# Patient Record
Sex: FEMALE | Race: OTHER | NOT HISPANIC OR LATINO | URBAN - METROPOLITAN AREA
[De-identification: names, ages, dates, MRNs, and addresses within clinical notes are randomized per-mention and may not be internally consistent; named-entity substitution may affect disease eponyms.]

---

## 2018-08-06 ENCOUNTER — OUTPATIENT (OUTPATIENT)
Dept: OUTPATIENT SERVICES | Facility: HOSPITAL | Age: 83
LOS: 1 days | Discharge: HOME | End: 2018-08-06

## 2018-08-06 ENCOUNTER — APPOINTMENT (OUTPATIENT)
Dept: HEMATOLOGY ONCOLOGY | Facility: CLINIC | Age: 83
End: 2018-08-06

## 2018-08-06 ENCOUNTER — LABORATORY RESULT (OUTPATIENT)
Age: 83
End: 2018-08-06

## 2018-08-06 VITALS
RESPIRATION RATE: 12 BRPM | SYSTOLIC BLOOD PRESSURE: 171 MMHG | HEIGHT: 65 IN | BODY MASS INDEX: 22.66 KG/M2 | DIASTOLIC BLOOD PRESSURE: 80 MMHG | TEMPERATURE: 97.9 F | HEART RATE: 115 BPM | WEIGHT: 136 LBS

## 2018-08-06 DIAGNOSIS — K30 FUNCTIONAL DYSPEPSIA: ICD-10-CM

## 2018-08-06 DIAGNOSIS — Z63.4 DISAPPEARANCE AND DEATH OF FAMILY MEMBER: ICD-10-CM

## 2018-08-06 DIAGNOSIS — Z86.39 PERSONAL HISTORY OF OTHER ENDOCRINE, NUTRITIONAL AND METABOLIC DISEASE: ICD-10-CM

## 2018-08-06 LAB
HCT VFR BLD CALC: 38.9 %
HGB BLD-MCNC: 13.2 G/DL
MCHC RBC-ENTMCNC: 29 PG
MCHC RBC-ENTMCNC: 33.9 G/DL
MCV RBC AUTO: 85.5 FL
PLATELET # BLD AUTO: 233 K/UL
PMV BLD: 9.1 FL
RBC # BLD: 4.55 M/UL
RBC # FLD: 13.2 %
WBC # FLD AUTO: 11.7 K/UL

## 2018-08-06 SDOH — SOCIAL STABILITY - SOCIAL INSECURITY: DISSAPEARANCE AND DEATH OF FAMILY MEMBER: Z63.4

## 2018-08-07 LAB
ALBUMIN SERPL ELPH-MCNC: 4.6 G/DL
ALP BLD-CCNC: 107 U/L
ALT SERPL-CCNC: 13 U/L
ANION GAP SERPL CALC-SCNC: 13 MMOL/L
AST SERPL-CCNC: 16 U/L
BILIRUB SERPL-MCNC: 0.3 MG/DL
BUN SERPL-MCNC: 16 MG/DL
CALCIUM SERPL-MCNC: 9.2 MG/DL
CHLORIDE SERPL-SCNC: 101 MMOL/L
CO2 SERPL-SCNC: 26 MMOL/L
CREAT SERPL-MCNC: 1 MG/DL
GLUCOSE SERPL-MCNC: 87 MG/DL
LDH SERPL-CCNC: 180
POTASSIUM SERPL-SCNC: 4.6 MMOL/L
PROT SERPL-MCNC: 8 G/DL
SODIUM SERPL-SCNC: 140 MMOL/L

## 2018-08-08 LAB
ALBUMIN MFR SERPL ELPH: 53 %
ALBUMIN SERPL-MCNC: 4.2 G/DL
ALBUMIN/GLOB SERPL: 1.1 RATIO
ALPHA1 GLOB MFR SERPL ELPH: 3.5 %
ALPHA1 GLOB SERPL ELPH-MCNC: 0.3 G/DL
ALPHA2 GLOB MFR SERPL ELPH: 7.4 %
ALPHA2 GLOB SERPL ELPH-MCNC: 0.6 G/DL
B-GLOBULIN MFR SERPL ELPH: 8.7 %
B-GLOBULIN SERPL ELPH-MCNC: 0.7 G/DL
GAMMA GLOB FLD ELPH-MCNC: 2.2 G/DL
GAMMA GLOB MFR SERPL ELPH: 27.4 %
INTERPRETATION SERPL IEP-IMP: NORMAL
M PROTEIN MFR SERPL ELPH: NORMAL %
M PROTEIN SPEC IFE-MCNC: NORMAL
MONOCLON BAND OBS SERPL: NORMAL G/DL
PROT SERPL-MCNC: 8 G/DL

## 2018-08-09 ENCOUNTER — LABORATORY RESULT (OUTPATIENT)
Age: 83
End: 2018-08-09

## 2018-08-09 LAB
DEPRECATED KAPPA LC FREE/LAMBDA SER: 1.85 RATIO
IGA SER QL IEP: 127 MG/DL
IGG SER QL IEP: 2364 MG/DL
IGM SER QL IEP: 178 MG/DL
KAPPA LC CSF-MCNC: 2.39 MG/DL
KAPPA LC SERPL-MCNC: 4.41 MG/DL

## 2018-08-10 LAB
CREAT 24H UR-MCNC: 0.6 G/24 HR
CREAT ?TM UR-MCNC: 26 MG/DL
PROT 24H UR-MRATE: 11 MG/DL
PROT ?TM UR-MCNC: 24 HR
PROT UR-MCNC: 270 MG/24 H
SPECIMEN VOL 24H UR: 2450 ML

## 2018-08-13 DIAGNOSIS — D47.2 MONOCLONAL GAMMOPATHY: ICD-10-CM

## 2018-08-13 LAB — IGA 24H UR QL IFE: NORMAL

## 2018-11-26 ENCOUNTER — APPOINTMENT (OUTPATIENT)
Dept: HEMATOLOGY ONCOLOGY | Facility: CLINIC | Age: 83
End: 2018-11-26

## 2018-11-26 ENCOUNTER — APPOINTMENT (OUTPATIENT)
Dept: SURGERY | Facility: CLINIC | Age: 83
End: 2018-11-26

## 2019-09-09 ENCOUNTER — APPOINTMENT (OUTPATIENT)
Dept: HEMATOLOGY ONCOLOGY | Facility: CLINIC | Age: 84
End: 2019-09-09
Payer: MEDICARE

## 2019-09-09 ENCOUNTER — LABORATORY RESULT (OUTPATIENT)
Age: 84
End: 2019-09-09

## 2019-09-09 ENCOUNTER — OUTPATIENT (OUTPATIENT)
Dept: OUTPATIENT SERVICES | Facility: HOSPITAL | Age: 84
LOS: 1 days | Discharge: HOME | End: 2019-09-09

## 2019-09-09 VITALS
BODY MASS INDEX: 21.33 KG/M2 | TEMPERATURE: 96.6 F | WEIGHT: 128 LBS | HEIGHT: 65 IN | SYSTOLIC BLOOD PRESSURE: 174 MMHG | HEART RATE: 87 BPM | DIASTOLIC BLOOD PRESSURE: 74 MMHG | RESPIRATION RATE: 14 BRPM

## 2019-09-09 PROCEDURE — 99213 OFFICE O/P EST LOW 20 MIN: CPT

## 2019-09-10 LAB
ALBUMIN SERPL ELPH-MCNC: 4.6 G/DL
ALP BLD-CCNC: 114 U/L
ALT SERPL-CCNC: 14 U/L
ANION GAP SERPL CALC-SCNC: 16 MMOL/L
AST SERPL-CCNC: 19 U/L
BILIRUB SERPL-MCNC: 0.3 MG/DL
BUN SERPL-MCNC: 17 MG/DL
CALCIUM SERPL-MCNC: 9.5 MG/DL
CHLORIDE SERPL-SCNC: 98 MMOL/L
CO2 SERPL-SCNC: 25 MMOL/L
CREAT SERPL-MCNC: 1 MG/DL
DEPRECATED KAPPA LC FREE/LAMBDA SER: 2.46 RATIO
GLUCOSE SERPL-MCNC: 109 MG/DL
HCT VFR BLD CALC: 40.4 %
HGB BLD-MCNC: 13.6 G/DL
IGG SER QL IEP: 2253 MG/DL
KAPPA LC CSF-MCNC: 1.64 MG/DL
KAPPA LC SERPL-MCNC: 4.04 MG/DL
MCHC RBC-ENTMCNC: 28.8 PG
MCHC RBC-ENTMCNC: 33.7 G/DL
MCV RBC AUTO: 85.6 FL
PLATELET # BLD AUTO: 230 K/UL
PMV BLD: 9.4 FL
POTASSIUM SERPL-SCNC: 4 MMOL/L
PROT SERPL-MCNC: 8.4 G/DL
RBC # BLD: 4.72 M/UL
RBC # FLD: 13.5 %
SODIUM SERPL-SCNC: 139 MMOL/L
T4 SERPL-MCNC: 5.5 UG/DL
TSH SERPL-ACNC: 1.36 UIU/ML
WBC # FLD AUTO: 10.95 K/UL

## 2019-09-10 NOTE — CONSULT LETTER
[Dear  ___] : Dear ~TONO, [Courtesy Letter:] : I had the pleasure of seeing your patient, [unfilled], in my office today. [Please see my note below.] : Please see my note below. [Consult Closing:] : Thank you very much for allowing me to participate in the care of this patient.  If you have any questions, please do not hesitate to contact me. [Sincerely,] : Sincerely, [FreeTextEntry2] : Dr. Caesar Fournier [FreeTextEntry3] : Dr. CHRISTY Lao

## 2019-09-10 NOTE — REVIEW OF SYSTEMS
[Fatigue] : fatigue [Recent Change In Weight] : ~T recent weight change [SOB on Exertion] : shortness of breath during exertion [Anxiety] : anxiety [Negative] : Heme/Lymph

## 2019-09-10 NOTE — ASSESSMENT
[FreeTextEntry1] : 1. IgG small monoclonal spike. Clinically stable.\par 2. Base of tongue squamous cell carcinoma, S/P surgery including bilateral neck dissection, 1 LN +.\par 3. Fatigue, which started after surgery.\par \par The situation was discussed at length with the patient and her daughter.\par She was encouraged to remain physically active and do simple physical activities such as walking.\par \par We will draw a CBC, CMP, LDH, SPEP with IFES, IgG, free light chains. \par Further recommendations after the above test results are available. If no significant change, the patient will be seen again in 6 months for follow up. Otherwise, she will keep all her appointments with all her other physicians and also proceed with the schedule of her screenings.\par \par All questions answered.

## 2019-09-10 NOTE — PHYSICAL EXAM
[Restricted in physically strenuous activity but ambulatory and able to carry out work of a light or sedentary nature] : Status 1- Restricted in physically strenuous activity but ambulatory and able to carry out work of a light or sedentary nature, e.g., light house work, office work [de-identified] : Arthritic changes present [Normal] : grossly intact [de-identified] : Bilateral scars noted on the neck corresponding to the lymph node dissection.

## 2019-09-10 NOTE — REASON FOR VISIT
[Follow-Up Visit] : a follow-up visit for [Family Member] : family member [FreeTextEntry2] : IgG monoclonal gammopathy

## 2019-09-10 NOTE — HISTORY OF PRESENT ILLNESS
[Disease:__________________________] : Disease: [unfilled] [de-identified] : The patient is coming for a follow up after a hiatus of over a year.\par Since her last visit with, the patient had developed mild difficulty swallowing, around November of last year, for which she consulted her physicians, including an ENT specialist who diagnosed a tumor at the base of the tongue (at least that's my impression form her history). Apparently it was p16 positive. The patient then underwent resection of the tumor and bilateral cervical lymp node dissection. She was found to have 1 positive lymph node out of many (?40?). Apparently her case was discussed in multidisciplinary meetings and it was decided not to give any further treatment such as radiation.\par The patient has recovered from surgery although during the tyra-surgical time she lost significant amount of weight but has been recovering. \par Her major problem is significant fatigue which is affecting also her mood as well as the appetite.\par Although she is able to move around, however she gets tired very fast and most of the time all she wants to do is rest.\par She is denying any cough or shortness of breath. No problems with urine or bowel movements. No new pains or aches even at the surgical sites. [de-identified] : Squamous cell

## 2019-09-11 LAB
ALBUMIN MFR SERPL ELPH: 55.8 %
ALBUMIN SERPL-MCNC: 4.7 G/DL
ALBUMIN/GLOB SERPL: 1.3 RATIO
ALPHA1 GLOB MFR SERPL ELPH: 3.4 %
ALPHA1 GLOB SERPL ELPH-MCNC: 0.3 G/DL
ALPHA2 GLOB MFR SERPL ELPH: 7 %
ALPHA2 GLOB SERPL ELPH-MCNC: 0.6 G/DL
B-GLOBULIN MFR SERPL ELPH: 8.6 %
B-GLOBULIN SERPL ELPH-MCNC: 0.7 G/DL
GAMMA GLOB FLD ELPH-MCNC: 2.1 G/DL
GAMMA GLOB MFR SERPL ELPH: 25.2 %
INTERPRETATION SERPL IEP-IMP: NORMAL
M PROTEIN MFR SERPL ELPH: 6.5 %
M PROTEIN SPEC IFE-MCNC: NORMAL
MONOCLON BAND OBS SERPL: 0.5 G/DL
PROT SERPL-MCNC: 8.4 G/DL

## 2019-09-12 DIAGNOSIS — R53.83 OTHER FATIGUE: ICD-10-CM

## 2019-09-12 DIAGNOSIS — D47.2 MONOCLONAL GAMMOPATHY: ICD-10-CM

## 2019-09-12 DIAGNOSIS — C76.0 MALIGNANT NEOPLASM OF HEAD, FACE AND NECK: ICD-10-CM

## 2019-10-20 ENCOUNTER — INPATIENT (INPATIENT)
Facility: HOSPITAL | Age: 84
LOS: 4 days | Discharge: NURSING, REHAB OR LONG-TERM CARE | End: 2019-10-25
Attending: HOSPITALIST | Admitting: HOSPITALIST
Payer: MEDICARE

## 2019-10-20 VITALS
RESPIRATION RATE: 18 BRPM | SYSTOLIC BLOOD PRESSURE: 180 MMHG | DIASTOLIC BLOOD PRESSURE: 73 MMHG | TEMPERATURE: 98 F | HEART RATE: 99 BPM | OXYGEN SATURATION: 99 %

## 2019-10-20 DIAGNOSIS — Z98.890 OTHER SPECIFIED POSTPROCEDURAL STATES: Chronic | ICD-10-CM

## 2019-10-20 DIAGNOSIS — Z90.49 ACQUIRED ABSENCE OF OTHER SPECIFIED PARTS OF DIGESTIVE TRACT: Chronic | ICD-10-CM

## 2019-10-20 DIAGNOSIS — H26.9 UNSPECIFIED CATARACT: Chronic | ICD-10-CM

## 2019-10-20 DIAGNOSIS — Z90.710 ACQUIRED ABSENCE OF BOTH CERVIX AND UTERUS: Chronic | ICD-10-CM

## 2019-10-20 LAB
ALBUMIN SERPL ELPH-MCNC: 4.7 G/DL — SIGNIFICANT CHANGE UP (ref 3.5–5.2)
ALP SERPL-CCNC: 115 U/L — SIGNIFICANT CHANGE UP (ref 30–115)
ALT FLD-CCNC: 18 U/L — SIGNIFICANT CHANGE UP (ref 0–41)
ANION GAP SERPL CALC-SCNC: 15 MMOL/L — HIGH (ref 7–14)
APTT BLD: 29.1 SEC — SIGNIFICANT CHANGE UP (ref 27–39.2)
AST SERPL-CCNC: 22 U/L — SIGNIFICANT CHANGE UP (ref 0–41)
BASOPHILS # BLD AUTO: 0.05 K/UL — SIGNIFICANT CHANGE UP (ref 0–0.2)
BASOPHILS NFR BLD AUTO: 0.2 % — SIGNIFICANT CHANGE UP (ref 0–1)
BILIRUB SERPL-MCNC: 0.4 MG/DL — SIGNIFICANT CHANGE UP (ref 0.2–1.2)
BLD GP AB SCN SERPL QL: SIGNIFICANT CHANGE UP
BUN SERPL-MCNC: 20 MG/DL — SIGNIFICANT CHANGE UP (ref 10–20)
CALCIUM SERPL-MCNC: 9.4 MG/DL — SIGNIFICANT CHANGE UP (ref 8.5–10.1)
CHLORIDE SERPL-SCNC: 97 MMOL/L — LOW (ref 98–110)
CO2 SERPL-SCNC: 24 MMOL/L — SIGNIFICANT CHANGE UP (ref 17–32)
CREAT SERPL-MCNC: 1 MG/DL — SIGNIFICANT CHANGE UP (ref 0.7–1.5)
EOSINOPHIL # BLD AUTO: 0.02 K/UL — SIGNIFICANT CHANGE UP (ref 0–0.7)
EOSINOPHIL NFR BLD AUTO: 0.1 % — SIGNIFICANT CHANGE UP (ref 0–8)
GLUCOSE SERPL-MCNC: 112 MG/DL — HIGH (ref 70–99)
HCT VFR BLD CALC: 40.4 % — SIGNIFICANT CHANGE UP (ref 37–47)
HGB BLD-MCNC: 13.5 G/DL — SIGNIFICANT CHANGE UP (ref 12–16)
IMM GRANULOCYTES NFR BLD AUTO: 1.1 % — HIGH (ref 0.1–0.3)
INR BLD: 1.09 RATIO — SIGNIFICANT CHANGE UP (ref 0.65–1.3)
LYMPHOCYTES # BLD AUTO: 0.95 K/UL — LOW (ref 1.2–3.4)
LYMPHOCYTES # BLD AUTO: 3.8 % — LOW (ref 20.5–51.1)
MCHC RBC-ENTMCNC: 28.4 PG — SIGNIFICANT CHANGE UP (ref 27–31)
MCHC RBC-ENTMCNC: 33.4 G/DL — SIGNIFICANT CHANGE UP (ref 32–37)
MCV RBC AUTO: 84.9 FL — SIGNIFICANT CHANGE UP (ref 81–99)
MONOCYTES # BLD AUTO: 1.22 K/UL — HIGH (ref 0.1–0.6)
MONOCYTES NFR BLD AUTO: 4.9 % — SIGNIFICANT CHANGE UP (ref 1.7–9.3)
NEUTROPHILS # BLD AUTO: 22.19 K/UL — HIGH (ref 1.4–6.5)
NEUTROPHILS NFR BLD AUTO: 89.9 % — HIGH (ref 42.2–75.2)
NRBC # BLD: 0 /100 WBCS — SIGNIFICANT CHANGE UP (ref 0–0)
PLATELET # BLD AUTO: 236 K/UL — SIGNIFICANT CHANGE UP (ref 130–400)
POTASSIUM SERPL-MCNC: 3.9 MMOL/L — SIGNIFICANT CHANGE UP (ref 3.5–5)
POTASSIUM SERPL-SCNC: 3.9 MMOL/L — SIGNIFICANT CHANGE UP (ref 3.5–5)
PROT SERPL-MCNC: 8.6 G/DL — HIGH (ref 6–8)
PROTHROM AB SERPL-ACNC: 12.5 SEC — SIGNIFICANT CHANGE UP (ref 9.95–12.87)
RBC # BLD: 4.76 M/UL — SIGNIFICANT CHANGE UP (ref 4.2–5.4)
RBC # FLD: 13.6 % — SIGNIFICANT CHANGE UP (ref 11.5–14.5)
SODIUM SERPL-SCNC: 136 MMOL/L — SIGNIFICANT CHANGE UP (ref 135–146)
WBC # BLD: 24.69 K/UL — HIGH (ref 4.8–10.8)
WBC # FLD AUTO: 24.69 K/UL — HIGH (ref 4.8–10.8)

## 2019-10-20 PROCEDURE — 71045 X-RAY EXAM CHEST 1 VIEW: CPT | Mod: 26

## 2019-10-20 PROCEDURE — 93010 ELECTROCARDIOGRAM REPORT: CPT

## 2019-10-20 PROCEDURE — 99285 EMERGENCY DEPT VISIT HI MDM: CPT | Mod: GC

## 2019-10-20 PROCEDURE — 72192 CT PELVIS W/O DYE: CPT | Mod: 26

## 2019-10-20 PROCEDURE — 73502 X-RAY EXAM HIP UNI 2-3 VIEWS: CPT | Mod: 26,RT

## 2019-10-20 PROCEDURE — 73552 X-RAY EXAM OF FEMUR 2/>: CPT | Mod: 26,RT

## 2019-10-20 RX ORDER — PREGABALIN 225 MG/1
1 CAPSULE ORAL
Qty: 0 | Refills: 0 | DISCHARGE

## 2019-10-20 RX ORDER — OXYCODONE AND ACETAMINOPHEN 5; 325 MG/1; MG/1
1 TABLET ORAL EVERY 6 HOURS
Refills: 0 | Status: DISCONTINUED | OUTPATIENT
Start: 2019-10-20 | End: 2019-10-21

## 2019-10-20 RX ORDER — DOCUSATE SODIUM 100 MG
100 CAPSULE ORAL
Refills: 0 | Status: DISCONTINUED | OUTPATIENT
Start: 2019-10-20 | End: 2019-10-22

## 2019-10-20 RX ORDER — FERROUS SULFATE 325(65) MG
0 TABLET ORAL
Qty: 0 | Refills: 0 | DISCHARGE

## 2019-10-20 RX ORDER — CHLORHEXIDINE GLUCONATE 213 G/1000ML
1 SOLUTION TOPICAL
Refills: 0 | Status: DISCONTINUED | OUTPATIENT
Start: 2019-10-20 | End: 2019-10-22

## 2019-10-20 RX ORDER — ONDANSETRON 8 MG/1
4 TABLET, FILM COATED ORAL ONCE
Refills: 0 | Status: COMPLETED | OUTPATIENT
Start: 2019-10-20 | End: 2019-10-20

## 2019-10-20 RX ORDER — MORPHINE SULFATE 50 MG/1
4 CAPSULE, EXTENDED RELEASE ORAL ONCE
Refills: 0 | Status: DISCONTINUED | OUTPATIENT
Start: 2019-10-20 | End: 2019-10-20

## 2019-10-20 RX ORDER — LOSARTAN POTASSIUM 100 MG/1
100 TABLET, FILM COATED ORAL DAILY
Refills: 0 | Status: DISCONTINUED | OUTPATIENT
Start: 2019-10-20 | End: 2019-10-22

## 2019-10-20 RX ORDER — ACETAMINOPHEN 500 MG
650 TABLET ORAL EVERY 4 HOURS
Refills: 0 | Status: DISCONTINUED | OUTPATIENT
Start: 2019-10-20 | End: 2019-10-22

## 2019-10-20 RX ORDER — IRBESARTAN 75 MG/1
1 TABLET ORAL
Qty: 0 | Refills: 0 | DISCHARGE

## 2019-10-20 RX ORDER — FERROUS SULFATE 325(65) MG
325 TABLET ORAL DAILY
Refills: 0 | Status: DISCONTINUED | OUTPATIENT
Start: 2019-10-20 | End: 2019-10-22

## 2019-10-20 RX ORDER — LEVOTHYROXINE SODIUM 125 MCG
50 TABLET ORAL DAILY
Refills: 0 | Status: DISCONTINUED | OUTPATIENT
Start: 2019-10-20 | End: 2019-10-22

## 2019-10-20 RX ORDER — MORPHINE SULFATE 50 MG/1
2 CAPSULE, EXTENDED RELEASE ORAL EVERY 6 HOURS
Refills: 0 | Status: DISCONTINUED | OUTPATIENT
Start: 2019-10-20 | End: 2019-10-22

## 2019-10-20 RX ADMIN — MORPHINE SULFATE 2 MILLIGRAM(S): 50 CAPSULE, EXTENDED RELEASE ORAL at 17:13

## 2019-10-20 RX ADMIN — MORPHINE SULFATE 4 MILLIGRAM(S): 50 CAPSULE, EXTENDED RELEASE ORAL at 14:20

## 2019-10-20 RX ADMIN — Medication 100 MILLIGRAM(S): at 18:12

## 2019-10-20 RX ADMIN — Medication 325 MILLIGRAM(S): at 18:11

## 2019-10-20 RX ADMIN — ONDANSETRON 4 MILLIGRAM(S): 8 TABLET, FILM COATED ORAL at 14:20

## 2019-10-20 RX ADMIN — Medication 1 TABLET(S): at 18:12

## 2019-10-20 RX ADMIN — Medication 1 TABLET(S): at 18:11

## 2019-10-20 RX ADMIN — OXYCODONE AND ACETAMINOPHEN 1 TABLET(S): 5; 325 TABLET ORAL at 22:23

## 2019-10-20 RX ADMIN — MORPHINE SULFATE 2 MILLIGRAM(S): 50 CAPSULE, EXTENDED RELEASE ORAL at 17:28

## 2019-10-20 RX ADMIN — MORPHINE SULFATE 2 MILLIGRAM(S): 50 CAPSULE, EXTENDED RELEASE ORAL at 21:14

## 2019-10-20 NOTE — ED ADULT NURSE NOTE - ED STAT RN HANDOFF DETAILS
pt resting comfortably, denies any discomfort. no distress noted at this time. iv intact, safety maintained, VSS. awaiting OR

## 2019-10-20 NOTE — ED PROVIDER NOTE - NS ED ROS FT
General: No fever, chills, or weakness.  Eyes:  No visual changes, eye pain or discharge.  ENMT:  No hearing changes, pain, no sore throat or runny nose, no difficulty swallowing  Cardiac:  No chest pain, SOB or edema. No chest pain with exertion.  Respiratory:  No cough or respiratory distress. No hemoptysis. No history of asthma or RAD.  GI:  No nausea, vomiting, diarrhea or abdominal pain.  :  No dysuria, frequency or burning.  MS:  Right hip pain  Neuro:  No headache.  No LOC. No change in ambulation. No dizziness.  Skin:  No skin rash.   Endocrine: No history of thyroid disease or diabetes.

## 2019-10-20 NOTE — H&P ADULT - NSICDXPASTSURGICALHX_GEN_ALL_CORE_FT
PAST SURGICAL HISTORY:  Capsular cataract of both eyes     H/O abdominal hysterectomy     History of parathyroid surgery     Status post right breast lumpectomy PAST SURGICAL HISTORY:  Capsular cataract of both eyes     H/O abdominal hysterectomy     History of parathyroid surgery     History of partial colectomy     Status post right breast lumpectomy

## 2019-10-20 NOTE — H&P ADULT - NSHPSOCIALHISTORY_GEN_ALL_CORE
Lives with: (  ) alone  (  ) children   (  ) spouse   (  ) parents  (  ) other  Recent Travel: No recent travel    Substance Use (street drugs): ( x ) never used  (  ) other:  Tobacco Usage:  ( x  ) never smoked   (   ) former smoker   (   ) current smoker  (     ) pack year  Alcohol Usage: None

## 2019-10-20 NOTE — CONSULT NOTE ADULT - ATTENDING COMMENTS
agree with above  seen and examined  will require operative fixation pending optimization and or availability

## 2019-10-20 NOTE — ED PROVIDER NOTE - PROGRESS NOTE DETAILS
84F not on AC p/w fall from standing. Shortened, ER RLE. Likely fx. Morphine, zofran, imaging, labs, ekg. Reassess after XR Attending Note: I personally evaluated the patient. I reviewed the Resident’s note, and agree with the findings and plan except as documented in my note.   85 yo F PMH of thyroid CA, breast CA, osteoporosis, iron deficiency, and HTN presents s/p mechanical fall today. Unable to get up second to pain on R hip. No LOC, head injury, CP, abd pain or SOB. Exam: vss, nontoxic, well appearing, airway intact, GCS 15, PERRL, EOMI, MMM, no cervical-thoracic-lumbar spine tenderness, neck supple, CTAB, no crepitus over chest wall, RRR, equal radial pulses bilat, abd soft/nt/nd, no fnd. FROMx4, no ecchymosis, (+) R lower extremity rotated and shortened. Log roll with pain. Neurovascularly intact. Plan: Labs, imaging and reassess. AA: Kathleen from ortho called, at bedside. Will order CT non-con of hip, as requested by ortho Request by ortho to get a CT pelvis pre op.

## 2019-10-20 NOTE — ED PROVIDER NOTE - OBJECTIVE STATEMENT
84F h/o HTN, hypothyroidism p/w fall. Mechanical fall from standing, fell onto right hip while in kitchen. Unable to get up on her own. Admits to severe right hip pain, 10/10, non radiating. Unable to move leg. Denies head trauma, LOC, CP, SOB, abd pain, n/v/d, back pain. 84F h/o HTN, hypothyroidism, s/p tonsillectomy, s/p right breast lumpectomy, s/p JACKELYN p/w fall. Mechanical fall from standing, fell onto right hip while in kitchen. Unable to get up on her own. Admits to severe right hip pain, 10/10, non radiating. Unable to move leg. Denies head trauma, LOC, CP, SOB, abd pain, n/v/d, back pain.

## 2019-10-20 NOTE — ED ADULT NURSE NOTE - PSH
Capsular cataract of both eyes    H/O abdominal hysterectomy    History of parathyroid surgery    Status post right breast lumpectomy

## 2019-10-20 NOTE — H&P ADULT - ASSESSMENT
85 yo female with PMHx of HTN, hypothyroidism, breast CA s/p lumpectomy presents s/p mechanical fall with right hip pain and inability to bear weight, found to have acute right femoral neck transcervical fracture.    #Acute right femoral neck transcervical fracture s/p mechanical fall  -Xray showed Acute right femoral neck transcervical fracture in varus angulation with severe right hip degenerative change and joint space narrowing. Moderate left hip osteoarthritis present.  -F/u CT pelvis  -Ortho: surgical fixation of right hip fracture with hemiarthroplasty vs total hip arthroplasty- likely tomorrow in the OR.   -NPO after midnight. Pain control prn. Bedrest for now  -Medical optimization requested by ortho  -PT/Rehab post surgical intervention    #HTN-c/w losartan 100mg  (converted from irbesartan); DASH diet  #Hypothyroidism-c/w Synthroid  #Breast Ca s/p Lumpectomy- Stable    #DVT ppx: SCDs for now  #GI ppx: not indicated  #Diet: DASH; NPO after MN  #Activity: Bedrest for now  #CHG  #FULL code  #Dispo: acute 83 yo female with PMHx of HTN, hypothyroidism, breast CA s/p lumpectomy presents s/p mechanical fall with right hip pain and inability to bear weight, found to have acute right femoral neck transcervical fracture.    #Acute right femoral neck transcervical fracture s/p mechanical fall  -Xray showed Acute right femoral neck transcervical fracture in varus angulation with severe right hip degenerative change and joint space narrowing. Moderate left hip osteoarthritis present.  -F/u CT pelvis  -Ortho: surgical fixation of right hip fracture with hemiarthroplasty vs total hip arthroplasty- likely tomorrow in the OR.   -NPO after midnight. Pain control prn. Bedrest for now  -Medical optimization requested by ortho  -PT/Rehab post surgical intervention (please order after surgery)    #HTN-c/w losartan 100mg  (converted from irbesartan); DASH diet  #Hypothyroidism-c/w Synthroid  #Breast Ca s/p Lumpectomy- Stable    #DVT ppx: SCDs for now  #GI ppx: not indicated  #Diet: DASH; NPO after MN  #Activity: Bedrest for now  #CHG  #FULL code  #Dispo: acute 85 yo female with PMHx of HTN, hypothyroidism, tonsillar CA s/p tonsillectomy and base of tongue resection, breast CA s/p lumpectomy presents s/p mechanical fall with right hip pain and inability to bear weight, found to have acute right femoral neck transcervical fracture.    #Acute right femoral neck transcervical fracture s/p mechanical fall  -Xray showed Acute right femoral neck transcervical fracture in varus angulation with severe right hip degenerative change and joint space narrowing. Moderate left hip osteoarthritis present.  -F/u CT pelvis  -Ortho: surgical fixation of right hip fracture with hemiarthroplasty vs total hip arthroplasty- likely tomorrow in the OR.   -NPO after midnight. Pain control prn. Bedrest for now  -Medical optimization requested by ortho  -PT/Rehab post surgical intervention (please order after surgery)    #HTN-c/w losartan 100mg  (converted from irbesartan); DASH diet  #Hypothyroidism-c/w Synthroid  #Breast Ca s/p Lumpectomy- Stable    #DVT ppx: SCDs for now  #GI ppx: not indicated  #Diet: DASH; NPO after MN  #Activity: Bedrest for now  #CHG  #FULL code  #Dispo: acute

## 2019-10-20 NOTE — H&P ADULT - HISTORY OF PRESENT ILLNESS
85 yo female  PMHx: HTN, hypothyroidism, Tonsil cancer, Breast Ca s/p Rt Lumpectomy, JACKELYN  CC: Fall      ER course: Vitals were /73, HR 99, T 98F, SpO2 99% on RA. Pt 85 yo female  PMHx: HTN, hypothyroidism, Tonsil cancer, Breast Ca s/p Rt Lumpectomy, JACKELYN  CC: Right hip pain and inability to ambulate s/p mechanical fall  History dates back to this morning when pt was vacuuming, tripped over the cord  and fell onto her right side. Pt had immediate pain afterwards with inability to ambulate. Pt did not feel dizzy prior or post fall. No generalized shaking body movements. Pt denied LOC, headaches, blurry vision, chest pain, palpitations, SOB, Nausea/vomiting, fever, rash or abdominal pain. Pt denied numbness, tingling or weakness of b/l LE  Pt was on the ground for     min and did not hit her head.   ER course: Vitals were /73, HR 99, T 98F, SpO2 99% on RA. XRay hip showed right femur fracture with orthopedics to take patient for surgical intervention tomorrow possibly. 85 yo female  PMHx: HTN, hypothyroidism, Tonsil cancer, Breast Ca s/p Rt Lumpectomy, s/p JACKELYN, s/p parathyroid surgery  CC: Right hip pain and inability to ambulate s/p mechanical fall  History dates back to this morning when pt was vacuuming, tripped over the cord  and fell onto her right side. Pt had immediate pain afterwards with inability to ambulate. Pt did not feel dizzy prior or post fall. No generalized shaking body movements. Pt denied LOC, headaches, blurry vision, chest pain, palpitations, SOB, Nausea/vomiting, fever, rash or abdominal pain. Pt denied numbness, tingling or weakness of b/l LE  Pt was on the ground for     min and did not hit her head.   ER course: Vitals were /73, HR 99, T 98F, SpO2 99% on RA. XRay hip showed right femur fracture with orthopedics to take patient for surgical intervention tomorrow possibly. 85 yo female  PMHx: HTN, hypothyroidism, GERD, Tonsil cancer, Breast Ca s/p Rt Lumpectomy, s/p JACKELYN, s/p parathyroid surgery, laparoscopic partial colectomy  CC: Right hip pain and inability to ambulate s/p mechanical fall  History dates back to this morning when pt was vacuuming, tripped over the cord  and fell onto her right side. Pt had immediate pain afterwards with inability to ambulate. Pt was on the ground for about 30 min and slid herself to get to the phone. Pt did not hit her head. Pt did not feel dizzy prior or post fall. No generalized shaking body movements. Pt denied LOC, headaches, blurry vision, chest pain, palpitations, SOB, Nausea/vomiting, fever, rash or abdominal pain. Pt denied numbness, tingling or weakness of b/l LE.   ER course: Vitals were /73, HR 99, T 98F, SpO2 99% on RA. XRay hip showed right femur fracture with orthopedics to take patient for surgical intervention tomorrow possibly. 85 yo female  PMHx: HTN, hypothyroidism, GERD, Breast Ca s/p Rt Lumpectomy, s/p JACKELYN, s/p parathyroid surgery, laparoscopic partial colectomy  CC: Right hip pain and inability to ambulate s/p mechanical fall  History dates back to this morning when pt was vacuuming, tripped over the cord  and fell onto her right side. Pt had immediate pain afterwards with inability to ambulate. Pt was on the ground for about 30 min and slid herself to get to the phone. Pt did not hit her head. Pt did not feel dizzy prior or post fall. No generalized shaking body movements. Pt denied LOC, headaches, blurry vision, chest pain, palpitations, SOB, Nausea/vomiting, fever, rash or abdominal pain. Pt denied numbness, tingling or weakness of b/l LE.   ER course: Vitals were /73, HR 99, T 98F, SpO2 99% on RA. XRay hip showed right femur fracture with orthopedics to take patient for surgical intervention tomorrow possibly. 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 Pt had immediate pain afterwards with inability to ambulate. Pt was on the ground for about 30 min and slid herself to get to the phone. Pt did not hit her head. Pt did not feel dizzy prior or post fall. No generalized shaking body movements. Pt denied LOC, headaches, blurry vision, chest pain, palpitations, SOB, Nausea/vomiting, fever, rash or abdominal pain. Pt denied numbness, tingling or weakness of b/l LE.   ER course: Vitals were /73, HR 99, T 98F, SpO2 99% on RA. XRay hip showed right femur fracture with orthopedics to take patient for surgical intervention tomorrow possibly.

## 2019-10-20 NOTE — H&P ADULT - ATTENDING COMMENTS
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  The patient was vacuuming her house when she tripped over the cord and fell to the floor.  She denied head trauma.  No palpitations, chest pain, or loss of consciousness was reported prior to her fall.    Fall complicated by right hip fracture: admit to the medical service.  Orthopedics consult was appreciated.  The patient may benefit from a devante-or-total right hip arthroplasty.  Keep NPO prior to surgery scheduled for this afternoon.  The patient is moderate risk for surgical complications.  No acute EKG changes were noted.  Maintain NWB to the RLE.  Continue Morphine PRN for pain.  Request PT evaluation following surgery.      Hypertension: continue Cozaar 100mg PO q24    Hypothyroidism: continue Synthroid as directed    GI/DVT prophylaxis

## 2019-10-20 NOTE — ED ADULT NURSE NOTE - PMH
HTN (hypertension)    Right breast cancer with malignant cells in regional lymph nodes no greater than 0.2 mm and no more than 200 cells    Tonsil cancer

## 2019-10-20 NOTE — H&P ADULT - NSHPREVIEWOFSYSTEMS_GEN_ALL_CORE
REVIEW OF SYSTEMS    CONSTITUTIONAL: No weakness, fevers or chills  RESPIRATORY: No cough, wheezing, hemoptysis; No shortness of breath  CARDIOVASCULAR: No chest pain or palpitations  GASTROINTESTINAL: No abdominal or epigastric pain. No nausea, vomiting, or hematemesis; No diarrhea or constipation. No melena or hematochezia.  GENITOURINARY: No dysuria, frequency or hematuria  NEUROLOGICAL: No numbness or weakness  SKIN: No itching, rashes    Vital Signs Last 24 Hrs  T(C): 36.6 (20 Oct 2019 15:26), Max: 36.7 (20 Oct 2019 13:57)  T(F): 97.9 (20 Oct 2019 15:26), Max: 98 (20 Oct 2019 13:57)  HR: 95 (20 Oct 2019 15:26) (95 - 99)  BP: 156/75 (20 Oct 2019 15:26) (156/75 - 180/73)  RR: 20 (20 Oct 2019 15:26) (18 - 20)  SpO2: 97% (20 Oct 2019 15:26) (97% - 99%) REVIEW OF SYSTEMS    CONSTITUTIONAL: No weakness, fevers or chills  RESPIRATORY: No cough, wheezing, hemoptysis; No shortness of breath  CARDIOVASCULAR: No chest pain or palpitations  GASTROINTESTINAL: No abdominal or epigastric pain. No nausea, vomiting, or hematemesis; No diarrhea or constipation. No melena or hematochezia.  GENITOURINARY: No dysuria, frequency or hematuria  NEUROLOGICAL: No numbness or weakness  EXT: RIght hip pain +  SKIN: No itching, rashes    Vital Signs Last 24 Hrs  T(C): 36.6 (20 Oct 2019 15:26), Max: 36.7 (20 Oct 2019 13:57)  T(F): 97.9 (20 Oct 2019 15:26), Max: 98 (20 Oct 2019 13:57)  HR: 95 (20 Oct 2019 15:26) (95 - 99)  BP: 156/75 (20 Oct 2019 15:26) (156/75 - 180/73)  RR: 20 (20 Oct 2019 15:26) (18 - 20)  SpO2: 97% (20 Oct 2019 15:26) (97% - 99%)

## 2019-10-20 NOTE — H&P ADULT - NSICDXPASTMEDICALHX_GEN_ALL_CORE_FT
PAST MEDICAL HISTORY:  HTN (hypertension)     Right breast cancer with malignant cells in regional lymph nodes no greater than 0.2 mm and no more than 200 cells     Tonsil cancer PAST MEDICAL HISTORY:  HTN (hypertension)     Monoclonal gammopathy     Right breast cancer with malignant cells in regional lymph nodes no greater than 0.2 mm and no more than 200 cells     Tonsil cancer

## 2019-10-20 NOTE — ED ADULT NURSE NOTE - CHPI ED NUR SYMPTOMS NEG
no fever/no abrasion/no bleeding/no numbness/no vomiting/no confusion/no loss of consciousness/no tingling

## 2019-10-20 NOTE — ED ADULT NURSE NOTE - OBJECTIVE STATEMENT
83 y/o female brought in for fall 83 y/o female brought in for mechanical fall around 10:30 am. Patient state she tripped and fell over an extension cord landing on right hip. Patient is not on any anticoagulants, no head trauma, no loc, no confusion. Patient complaint of severe pain on right hip, patient has right leg shortening.

## 2019-10-20 NOTE — H&P ADULT - NSHPLABSRESULTS_GEN_ALL_CORE
13.5   24.69 )-----------( 236      ( 20 Oct 2019 14:10 )             40.4       10-20    136  |  97<L>  |  20  ----------------------------<  112<H>  3.9   |  24  |  1.0    Ca    9.4      20 Oct 2019 14:10    TPro  8.6<H>  /  Alb  4.7  /  TBili  0.4  /  DBili  x   /  AST  22  /  ALT  18  /  AlkPhos  115  10-20    PT/INR - ( 20 Oct 2019 14:10 )   PT: 12.50 sec;   INR: 1.09 ratio    PTT - ( 20 Oct 2019 14:10 )  PTT:29.1 sec    < from: Xray Femur 2 Views, Right (10.20.19 @ 14:43) >    FINDINGS/  IMPRESSION:    Right hip: Acute right femoral neck transcervical fracture in varus   angulation with severe right hip degenerative change and joint space   narrowing. Moderate left hip osteoarthritis present..    Right femur: No additional fractures with mild patellofemoral joint space   narrowing. No large suprapatellar joint effusion. Collier catheter   partially imaged

## 2019-10-20 NOTE — H&P ADULT - NSHPPHYSICALEXAM_GEN_ALL_CORE
PHYSICAL EXAM:  GENERAL: NAD, speaks in full sentences, no signs of respiratory distress  HEAD:  Atraumatic, Normocephalic  EYES: EOMI, PERRLA, conjunctiva and sclera clear  NECK: Supple, No JVD  CHEST/LUNG: Clear to auscultation bilaterally; No wheeze; No crackles; No accessory muscles used  HEART: Regular rate and rhythm; No murmurs;   ABDOMEN: Soft, Nontender, Nondistended; Bowel sounds present; No guarding  EXTREMITIES:  2+ Peripheral Pulses, No cyanosis or edema  PSYCH: AAOx3  NEUROLOGY: non-focal  SKIN: No rashes or lesions PHYSICAL EXAM:  GENERAL: Mild distress due to pain, speaks in full sentences, no signs of respiratory distress  HEAD:  Atraumatic, Normocephalic  EYES: PERRLA, conjunctiva and sclera clear  NECK: Supple, No JVD  CHEST/LUNG: Clear to auscultation bilaterally; No wheeze; No crackles; No accessory muscles used  HEART: Regular rate and rhythm  ABDOMEN: Soft, Nontender, Nondistended; Bowel sounds present; No guarding  EXTREMITIES:  2+ Peripheral Pulses, No cyanosis or edema; Limited ROM due to pain right LE  NEUROLOGY: non-focal  SKIN: No rashes or lesions

## 2019-10-20 NOTE — ED ADULT NURSE NOTE - NSIMPLEMENTINTERV_GEN_ALL_ED
Implemented All Fall with Harm Risk Interventions:  [place] to call system. Call bell, personal items and telephone within reach. Instruct patient to call for assistance. Room bathroom lighting operational. Non-slip footwear when patient is off stretcher. Physically safe environment: no spills, clutter or unnecessary equipment. Stretcher in lowest position, wheels locked, appropriate side rails in place. Provide visual cue, wrist band, yellow gown, etc. Monitor gait and stability. Monitor for mental status changes and reorient to person, place, and time. Review medications for side effects contributing to fall risk. Reinforce activity limits and safety measures with patient and family. Provide visual clues: red socks.

## 2019-10-21 LAB
ALBUMIN SERPL ELPH-MCNC: 4.2 G/DL — SIGNIFICANT CHANGE UP (ref 3.5–5.2)
ALP SERPL-CCNC: 104 U/L — SIGNIFICANT CHANGE UP (ref 30–115)
ALT FLD-CCNC: 17 U/L — SIGNIFICANT CHANGE UP (ref 0–41)
ANION GAP SERPL CALC-SCNC: 14 MMOL/L — SIGNIFICANT CHANGE UP (ref 7–14)
AST SERPL-CCNC: 21 U/L — SIGNIFICANT CHANGE UP (ref 0–41)
BASOPHILS # BLD AUTO: 0.03 K/UL — SIGNIFICANT CHANGE UP (ref 0–0.2)
BASOPHILS NFR BLD AUTO: 0.2 % — SIGNIFICANT CHANGE UP (ref 0–1)
BILIRUB SERPL-MCNC: 0.8 MG/DL — SIGNIFICANT CHANGE UP (ref 0.2–1.2)
BUN SERPL-MCNC: 15 MG/DL — SIGNIFICANT CHANGE UP (ref 10–20)
CALCIUM SERPL-MCNC: 9.3 MG/DL — SIGNIFICANT CHANGE UP (ref 8.5–10.1)
CHLORIDE SERPL-SCNC: 98 MMOL/L — SIGNIFICANT CHANGE UP (ref 98–110)
CHOLEST SERPL-MCNC: 152 MG/DL — SIGNIFICANT CHANGE UP (ref 100–200)
CO2 SERPL-SCNC: 25 MMOL/L — SIGNIFICANT CHANGE UP (ref 17–32)
CREAT SERPL-MCNC: 1.1 MG/DL — SIGNIFICANT CHANGE UP (ref 0.7–1.5)
EOSINOPHIL # BLD AUTO: 0.09 K/UL — SIGNIFICANT CHANGE UP (ref 0–0.7)
EOSINOPHIL NFR BLD AUTO: 0.7 % — SIGNIFICANT CHANGE UP (ref 0–8)
GLUCOSE BLDC GLUCOMTR-MCNC: 107 MG/DL — HIGH (ref 70–99)
GLUCOSE SERPL-MCNC: 106 MG/DL — HIGH (ref 70–99)
HCT VFR BLD CALC: 36.9 % — LOW (ref 37–47)
HDLC SERPL-MCNC: 38 MG/DL — LOW
HGB BLD-MCNC: 12.5 G/DL — SIGNIFICANT CHANGE UP (ref 12–16)
IMM GRANULOCYTES NFR BLD AUTO: 0.7 % — HIGH (ref 0.1–0.3)
LIPID PNL WITH DIRECT LDL SERPL: 116 MG/DL — SIGNIFICANT CHANGE UP (ref 4–129)
LYMPHOCYTES # BLD AUTO: 1.28 K/UL — SIGNIFICANT CHANGE UP (ref 1.2–3.4)
LYMPHOCYTES # BLD AUTO: 9.5 % — LOW (ref 20.5–51.1)
MAGNESIUM SERPL-MCNC: 2 MG/DL — SIGNIFICANT CHANGE UP (ref 1.8–2.4)
MCHC RBC-ENTMCNC: 28.3 PG — SIGNIFICANT CHANGE UP (ref 27–31)
MCHC RBC-ENTMCNC: 33.9 G/DL — SIGNIFICANT CHANGE UP (ref 32–37)
MCV RBC AUTO: 83.7 FL — SIGNIFICANT CHANGE UP (ref 81–99)
MONOCYTES # BLD AUTO: 0.92 K/UL — HIGH (ref 0.1–0.6)
MONOCYTES NFR BLD AUTO: 6.9 % — SIGNIFICANT CHANGE UP (ref 1.7–9.3)
NEUTROPHILS # BLD AUTO: 11 K/UL — HIGH (ref 1.4–6.5)
NEUTROPHILS NFR BLD AUTO: 82 % — HIGH (ref 42.2–75.2)
NRBC # BLD: 0 /100 WBCS — SIGNIFICANT CHANGE UP (ref 0–0)
PLATELET # BLD AUTO: 212 K/UL — SIGNIFICANT CHANGE UP (ref 130–400)
POTASSIUM SERPL-MCNC: 4.1 MMOL/L — SIGNIFICANT CHANGE UP (ref 3.5–5)
POTASSIUM SERPL-SCNC: 4.1 MMOL/L — SIGNIFICANT CHANGE UP (ref 3.5–5)
PROT SERPL-MCNC: 7.4 G/DL — SIGNIFICANT CHANGE UP (ref 6–8)
RBC # BLD: 4.41 M/UL — SIGNIFICANT CHANGE UP (ref 4.2–5.4)
RBC # FLD: 13.6 % — SIGNIFICANT CHANGE UP (ref 11.5–14.5)
SODIUM SERPL-SCNC: 137 MMOL/L — SIGNIFICANT CHANGE UP (ref 135–146)
TOTAL CHOLESTEROL/HDL RATIO MEASUREMENT: 4 RATIO — SIGNIFICANT CHANGE UP (ref 4–5.5)
TRIGL SERPL-MCNC: 61 MG/DL — SIGNIFICANT CHANGE UP (ref 10–149)
WBC # BLD: 13.41 K/UL — HIGH (ref 4.8–10.8)
WBC # FLD AUTO: 13.41 K/UL — HIGH (ref 4.8–10.8)

## 2019-10-21 PROCEDURE — 99223 1ST HOSP IP/OBS HIGH 75: CPT | Mod: AI

## 2019-10-21 RX ORDER — OXYCODONE AND ACETAMINOPHEN 5; 325 MG/1; MG/1
1 TABLET ORAL EVERY 4 HOURS
Refills: 0 | Status: DISCONTINUED | OUTPATIENT
Start: 2019-10-21 | End: 2019-10-22

## 2019-10-21 RX ORDER — SODIUM CHLORIDE 9 MG/ML
1000 INJECTION INTRAMUSCULAR; INTRAVENOUS; SUBCUTANEOUS
Refills: 0 | Status: DISCONTINUED | OUTPATIENT
Start: 2019-10-21 | End: 2019-10-22

## 2019-10-21 RX ADMIN — OXYCODONE AND ACETAMINOPHEN 1 TABLET(S): 5; 325 TABLET ORAL at 15:23

## 2019-10-21 RX ADMIN — Medication 1 DROP(S): at 17:27

## 2019-10-21 RX ADMIN — Medication 650 MILLIGRAM(S): at 01:31

## 2019-10-21 RX ADMIN — Medication 1 TABLET(S): at 11:01

## 2019-10-21 RX ADMIN — LOSARTAN POTASSIUM 100 MILLIGRAM(S): 100 TABLET, FILM COATED ORAL at 05:28

## 2019-10-21 RX ADMIN — SODIUM CHLORIDE 50 MILLILITER(S): 9 INJECTION INTRAMUSCULAR; INTRAVENOUS; SUBCUTANEOUS at 03:18

## 2019-10-21 RX ADMIN — OXYCODONE AND ACETAMINOPHEN 1 TABLET(S): 5; 325 TABLET ORAL at 04:47

## 2019-10-21 RX ADMIN — Medication 100 MILLIGRAM(S): at 05:29

## 2019-10-21 RX ADMIN — Medication 50 MICROGRAM(S): at 05:29

## 2019-10-21 RX ADMIN — Medication 325 MILLIGRAM(S): at 11:01

## 2019-10-21 RX ADMIN — OXYCODONE AND ACETAMINOPHEN 1 TABLET(S): 5; 325 TABLET ORAL at 10:59

## 2019-10-21 RX ADMIN — Medication 1 DROP(S): at 05:30

## 2019-10-21 RX ADMIN — Medication 100 MILLIGRAM(S): at 17:27

## 2019-10-21 RX ADMIN — OXYCODONE AND ACETAMINOPHEN 1 TABLET(S): 5; 325 TABLET ORAL at 21:16

## 2019-10-21 NOTE — PRE-ANESTHESIA EVALUATION ADULT - NSANTHOSAYNRD_GEN_A_CORE
No. CURT screening performed.  STOP BANG Legend: 0-2 = LOW Risk; 3-4 = INTERMEDIATE Risk; 5-8 = HIGH Risk

## 2019-10-21 NOTE — PRE-ANESTHESIA EVALUATION ADULT - MALLAMPATI CLASS
Class III - visualization of the soft palate and the base of the uvula Class IV (difficult) - the soft palate is not visible at all Class II - visualization of the soft palate, fauces, and uvula

## 2019-10-21 NOTE — PROGRESS NOTE ADULT - SUBJECTIVE AND OBJECTIVE BOX
HPI  Patient is a 84y old Female who presents with a chief complaint of Fall (21 Oct 2019 05:59)    Currently admitted to medicine with the primary diagnosis of Closed fracture of right hip, initial encounter     Today is hospital day 1d.     INTERVAL HPI / OVERNIGHT EVENTS:  Patient was examined and seen at bedside. This morning she is resting comfortably in bed and reports no new issues or overnight events.     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 no greater than 0.2 mm and no more than 200 cells  Tonsil cancer  History of partial colectomy  Status post right breast lumpectomy  H/O abdominal hysterectomy  History of parathyroid surgery  Capsular cataract of both eyes    ALLERGIES  Biaxin (Unknown)  erythromycin (Unknown)  penicillin (Unknown)    MEDICATIONS  STANDING MEDICATIONS  calcium carbonate 1250 mG  + Vitamin D (OsCal 500 + D) 1 Tablet(s) Oral daily  chlorhexidine 4% Liquid 1 Application(s) Topical <User Schedule>  docusate sodium 100 milliGRAM(s) Oral two times a day  ferrous    sulfate 325 milliGRAM(s) Oral daily  levothyroxine 50 MICROGram(s) Oral daily  losartan 100 milliGRAM(s) Oral daily  multivitamin 1 Tablet(s) Oral daily  polyvinyl alcohol 1.4%/povidone 0.6% Ophthalmic Solution - Peds 1 Drop(s) Both EYES two times a day  sodium chloride 0.9%. 1000 milliLiter(s) IV Continuous <Continuous>    PRN MEDICATIONS  acetaminophen   Tablet .. 650 milliGRAM(s) Oral every 4 hours PRN  morphine  - Injectable 2 milliGRAM(s) IV Push every 6 hours PRN  oxyCODONE    5 mG/acetaminophen 325 mG 1 Tablet(s) Oral every 6 hours PRN    VITALS:  T(F): 98.1  HR: 85  BP: 141/65  RR: 18  SpO2: 98%    PHYSICAL EXAM  GEN: NAD, Resting comfortably in bed  PULM: Clear to auscultation bilaterally, No wheezes  CVS: Regular rate and rhythm, S1-S2, no murmurs  ABD: Soft, non-tender, non-distended, no guarding  EXT: No edema  NEURO: A&Ox3, no focal deficits    LABS                        12.5   13.41 )-----------( 212      ( 21 Oct 2019 05:36 )             36.9     10-21    137  |  98  |  15  ----------------------------<  106<H>  4.1   |  25  |  1.1    Ca    9.3      21 Oct 2019 05:36  Mg     2.0     10-21    TPro  7.4  /  Alb  4.2  /  TBili  0.8  /  DBili  x   /  AST  21  /  ALT  17  /  AlkPhos  104  10-21    PT/INR - ( 20 Oct 2019 14:10 )   PT: 12.50 sec;   INR: 1.09 ratio         PTT - ( 20 Oct 2019 14:10 )  PTT:29.1 sec      RADIOLOGY    < from: CT Pelvis No Cont (10.20.19 @ 17:09) >  IMPRESSION:  1.  Acute right femoral transcervical fracture with varus angulation.  2.  Degenerative changes as above with qualitative osteopenia.    < end of copied text >

## 2019-10-21 NOTE — PROGRESS NOTE ADULT - ASSESSMENT
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    #Acute right femoral neck transcervical fracture s/p mechanical fall  -Xray showed Acute right femoral neck transcervical fracture in varus angulation with severe right hip degenerative change and joint space narrowing. Moderate left hip osteoarthritis present.  -Ortho: Surgical fixation of right hip fracture with hemiarthroplasty vs total hip arthroplasty-OR today  *Medically patient is at moderate risk for surgical intervention.*  -CT pelvis: acute right femoral transcervical fracture with varus angulation   - Pain control prn. Bedrest for now  -PT/Rehab post surgical intervention (please order after surgery)    #HTN-c/w losartan 100mg  (converted from irbesartan); DASH diet  #Hypothyroidism-c/w Synthroid  #Breast Ca s/p Lumpectomy- Stable    #DVT ppx: SCDs for now  #GI ppx: not indicated  #Diet: DASH; NPO currently for OR  #Activity: Bedrest for now  #CHG  #FULL code  #Dispo: acute

## 2019-10-21 NOTE — PROGRESS NOTE ADULT - SUBJECTIVE AND OBJECTIVE BOX
Ortho Preop Note    Patient is a 84y old s/p fall    Diagnosis: right femoral neck fractre  Procedure: right hip hemiarthroplasty vs total arthroplasty  Surgeon: Miriam/Sean/Rafiq                          13.5   24.69 )-----------( 236      ( 20 Oct 2019 14:10 )             40.4     10-20    136  |  97<L>  |  20  ----------------------------<  112<H>  3.9   |  24  |  1.0    Ca    9.4      20 Oct 2019 14:10    TPro  8.6<H>  /  Alb  4.7  /  TBili  0.4  /  DBili  x   /  AST  22  /  ALT  18  /  AlkPhos  115  10-20    PT/INR - ( 20 Oct 2019 14:10 )   PT: 12.50 sec;   INR: 1.09 ratio         PTT - ( 20 Oct 2019 14:10 )  PTT:29.1 sec    [ ] Med Clearance pending  [x ] Type & Screen  [x]pRBCs  [x] CBC  [x ] BMP  [x] PT/PTT/INR  [x ] Chest X-ray  [x ] EKG  [x ] NPO/IVF  [x ] Consent  [x ] Added on to OR Schedule    Assessment & Plan:  84yFemale with right femoral neck fracure  -For OR today 10/21

## 2019-10-22 ENCOUNTER — RESULT REVIEW (OUTPATIENT)
Age: 84
End: 2019-10-22

## 2019-10-22 LAB
ANION GAP SERPL CALC-SCNC: 12 MMOL/L — SIGNIFICANT CHANGE UP (ref 7–14)
BASOPHILS # BLD AUTO: 0.04 K/UL — SIGNIFICANT CHANGE UP (ref 0–0.2)
BASOPHILS NFR BLD AUTO: 0.3 % — SIGNIFICANT CHANGE UP (ref 0–1)
BUN SERPL-MCNC: 14 MG/DL — SIGNIFICANT CHANGE UP (ref 10–20)
CALCIUM SERPL-MCNC: 8.2 MG/DL — LOW (ref 8.5–10.1)
CHLORIDE SERPL-SCNC: 98 MMOL/L — SIGNIFICANT CHANGE UP (ref 98–110)
CO2 SERPL-SCNC: 24 MMOL/L — SIGNIFICANT CHANGE UP (ref 17–32)
CREAT SERPL-MCNC: 1 MG/DL — SIGNIFICANT CHANGE UP (ref 0.7–1.5)
EOSINOPHIL # BLD AUTO: 0.21 K/UL — SIGNIFICANT CHANGE UP (ref 0–0.7)
EOSINOPHIL NFR BLD AUTO: 1.5 % — SIGNIFICANT CHANGE UP (ref 0–8)
GLUCOSE BLDC GLUCOMTR-MCNC: 90 MG/DL — SIGNIFICANT CHANGE UP (ref 70–99)
GLUCOSE SERPL-MCNC: 109 MG/DL — HIGH (ref 70–99)
HCT VFR BLD CALC: 36.1 % — LOW (ref 37–47)
HGB BLD-MCNC: 12 G/DL — SIGNIFICANT CHANGE UP (ref 12–16)
IMM GRANULOCYTES NFR BLD AUTO: 0.4 % — HIGH (ref 0.1–0.3)
LYMPHOCYTES # BLD AUTO: 1.15 K/UL — LOW (ref 1.2–3.4)
LYMPHOCYTES # BLD AUTO: 8.1 % — LOW (ref 20.5–51.1)
MCHC RBC-ENTMCNC: 28.2 PG — SIGNIFICANT CHANGE UP (ref 27–31)
MCHC RBC-ENTMCNC: 33.2 G/DL — SIGNIFICANT CHANGE UP (ref 32–37)
MCV RBC AUTO: 84.9 FL — SIGNIFICANT CHANGE UP (ref 81–99)
MONOCYTES # BLD AUTO: 1.17 K/UL — HIGH (ref 0.1–0.6)
MONOCYTES NFR BLD AUTO: 8.3 % — SIGNIFICANT CHANGE UP (ref 1.7–9.3)
NEUTROPHILS # BLD AUTO: 11.52 K/UL — HIGH (ref 1.4–6.5)
NEUTROPHILS NFR BLD AUTO: 81.4 % — HIGH (ref 42.2–75.2)
NRBC # BLD: 0 /100 WBCS — SIGNIFICANT CHANGE UP (ref 0–0)
PLATELET # BLD AUTO: 178 K/UL — SIGNIFICANT CHANGE UP (ref 130–400)
POTASSIUM SERPL-MCNC: 3.9 MMOL/L — SIGNIFICANT CHANGE UP (ref 3.5–5)
POTASSIUM SERPL-SCNC: 3.9 MMOL/L — SIGNIFICANT CHANGE UP (ref 3.5–5)
RBC # BLD: 4.25 M/UL — SIGNIFICANT CHANGE UP (ref 4.2–5.4)
RBC # FLD: 13.8 % — SIGNIFICANT CHANGE UP (ref 11.5–14.5)
SODIUM SERPL-SCNC: 134 MMOL/L — LOW (ref 135–146)
WBC # BLD: 14.14 K/UL — HIGH (ref 4.8–10.8)
WBC # FLD AUTO: 14.14 K/UL — HIGH (ref 4.8–10.8)

## 2019-10-22 PROCEDURE — 88311 DECALCIFY TISSUE: CPT | Mod: 26

## 2019-10-22 PROCEDURE — 99233 SBSQ HOSP IP/OBS HIGH 50: CPT

## 2019-10-22 PROCEDURE — 88305 TISSUE EXAM BY PATHOLOGIST: CPT | Mod: 26

## 2019-10-22 PROCEDURE — 99223 1ST HOSP IP/OBS HIGH 75: CPT

## 2019-10-22 RX ADMIN — Medication 1 DROP(S): at 05:47

## 2019-10-22 RX ADMIN — OXYCODONE AND ACETAMINOPHEN 1 TABLET(S): 5; 325 TABLET ORAL at 09:59

## 2019-10-22 RX ADMIN — Medication 1 DROP(S): at 17:43

## 2019-10-22 RX ADMIN — Medication 325 MILLIGRAM(S): at 11:13

## 2019-10-22 RX ADMIN — Medication 100 MILLIGRAM(S): at 17:42

## 2019-10-22 RX ADMIN — LOSARTAN POTASSIUM 100 MILLIGRAM(S): 100 TABLET, FILM COATED ORAL at 05:43

## 2019-10-22 RX ADMIN — OXYCODONE AND ACETAMINOPHEN 1 TABLET(S): 5; 325 TABLET ORAL at 05:41

## 2019-10-22 RX ADMIN — Medication 1 TABLET(S): at 11:13

## 2019-10-22 RX ADMIN — Medication 100 MILLIGRAM(S): at 05:43

## 2019-10-22 RX ADMIN — Medication 50 MICROGRAM(S): at 05:43

## 2019-10-22 RX ADMIN — OXYCODONE AND ACETAMINOPHEN 1 TABLET(S): 5; 325 TABLET ORAL at 15:29

## 2019-10-22 NOTE — PROGRESS NOTE ADULT - ASSESSMENT
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    #Acute right femoral neck transcervical fracture s/p mechanical fall  -Xray showed Acute right femoral neck transcervical fracture in varus angulation with severe right hip degenerative change and joint space narrowing. Moderate left hip osteoarthritis present.  -Ortho: Surgical fixation of right hip fracture with hemiarthroplasty vs total hip arthroplasty-OR today  -maintain NWB to RLE  *Medically patient is at moderate risk for surgical intervention.*  -CT pelvis: acute right femoral transcervical fracture with varus angulation   - Pain control prn. Bedrest for now. Maintain NWB to RLE.   -PT/Rehab post surgical intervention (please order after surgery)    #HTN-c/w losartan 100mg  (converted from irbesartan); DASH diet  #Hypothyroidism-c/w Synthroid  #Breast Ca s/p Lumpectomy- Stable    #DVT ppx: SCDs for now  #GI ppx: not indicated  #Diet: DASH; NPO currently for OR  #Activity: Bedrest for now  #CHG  #FULL code  #Dispo: acute

## 2019-10-22 NOTE — PROGRESS NOTE ADULT - ASSESSMENT
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    Fall complicated by right hip fracture: maintain NWB to the RLE.  Keep NPO prior to OR scheduled for later this afternoon.  Orthopedics follow-up appreciated; the patient will benefit from devante-or-total right hip arthroplasty.  The patient is moderate risk for surgical complications. Continue Morphine PRN for pain.  Continue colace to prevent constipation.  Request PT evaluation following surgery.    Hypertension: blood pressure remains stable while Cozaar on hold.  Hypothyroidism: continue Synthroid as directed  GI/DVT prophylaxis    #Progress Note Handoff    Pending:    Other: Surgical repair of the right hip    Future Disposition: To be determined

## 2019-10-22 NOTE — PROGRESS NOTE ADULT - SUBJECTIVE AND OBJECTIVE BOX
Called in to take over the care of the patient due to OR/ surgeon availability  Pt. seen/ examined  No additional injuries  Skin intact  RLE: NVID  Labs/ vitals/ imaging reviewed  Disagree with XR reading of severe DJD R hip: artefact finding caused by femoral head rotation

## 2019-10-22 NOTE — PROGRESS NOTE ADULT - SUBJECTIVE AND OBJECTIVE BOX
HPI  Patient is a 84y old Female who presents with a chief complaint of Fall (22 Oct 2019 11:58)    Currently admitted to medicine with the primary diagnosis of Closed fracture of right hip, initial encounter     Today is hospital day 2d.     INTERVAL HPI / OVERNIGHT EVENTS:  Patient was examined and seen at bedside. This morning she is resting comfortably in bed and reports no new issues or overnight events.     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 no greater than 0.2 mm and no more than 200 cells  Tonsil cancer  History of partial colectomy  Status post right breast lumpectomy  H/O abdominal hysterectomy  History of parathyroid surgery  Capsular cataract of both eyes    ALLERGIES  Biaxin (Unknown)  erythromycin (Unknown)  penicillin (Unknown)    MEDICATIONS  STANDING MEDICATIONS  calcium carbonate 1250 mG  + Vitamin D (OsCal 500 + D) 1 Tablet(s) Oral daily  chlorhexidine 4% Liquid 1 Application(s) Topical <User Schedule>  docusate sodium 100 milliGRAM(s) Oral two times a day  ferrous    sulfate 325 milliGRAM(s) Oral daily  levothyroxine 50 MICROGram(s) Oral daily  losartan 100 milliGRAM(s) Oral daily  multivitamin 1 Tablet(s) Oral daily  polyvinyl alcohol 1.4%/povidone 0.6% Ophthalmic Solution - Peds 1 Drop(s) Both EYES two times a day  sodium chloride 0.9%. 1000 milliLiter(s) IV Continuous <Continuous>    PRN MEDICATIONS  acetaminophen   Tablet .. 650 milliGRAM(s) Oral every 4 hours PRN  morphine  - Injectable 2 milliGRAM(s) IV Push every 6 hours PRN  oxyCODONE    5 mG/acetaminophen 325 mG 1 Tablet(s) Oral every 4 hours PRN    VITALS:  T(F): 98.1  HR: 83  BP: 131/72  RR: 18  SpO2: --    PHYSICAL EXAM  GEN: NAD, Resting comfortably in bed  PULM: Clear to auscultation bilaterally, No wheezes  CVS: Regular rate and rhythm, S1-S2, no murmurs  ABD: Soft, non-tender, non-distended, no guarding  EXT: No edema  NEURO: A&Ox3, no focal deficits    LABS                        12.0   14.14 )-----------( 178      ( 22 Oct 2019 05:02 )             36.1     10-22    134<L>  |  98  |  14  ----------------------------<  109<H>  3.9   |  24  |  1.0    Ca    8.2<L>      22 Oct 2019 05:02  Mg     2.0     10-21    TPro  7.4  /  Alb  4.2  /  TBili  0.8  /  DBili  x   /  AST  21  /  ALT  17  /  AlkPhos  104  10-21    PT/INR - ( 20 Oct 2019 14:10 )   PT: 12.50 sec;   INR: 1.09 ratio         PTT - ( 20 Oct 2019 14:10 )  PTT:29.1 sec              RADIOLOGY

## 2019-10-23 DIAGNOSIS — S72.001A FRACTURE OF UNSPECIFIED PART OF NECK OF RIGHT FEMUR, INITIAL ENCOUNTER FOR CLOSED FRACTURE: ICD-10-CM

## 2019-10-23 LAB
ANION GAP SERPL CALC-SCNC: 13 MMOL/L — SIGNIFICANT CHANGE UP (ref 7–14)
ANION GAP SERPL CALC-SCNC: 17 MMOL/L — HIGH (ref 7–14)
APPEARANCE UR: ABNORMAL
BACTERIA # UR AUTO: ABNORMAL
BILIRUB UR-MCNC: NEGATIVE — SIGNIFICANT CHANGE UP
BUN SERPL-MCNC: 16 MG/DL — SIGNIFICANT CHANGE UP (ref 10–20)
BUN SERPL-MCNC: 20 MG/DL — SIGNIFICANT CHANGE UP (ref 10–20)
CALCIUM SERPL-MCNC: 7.6 MG/DL — LOW (ref 8.5–10.1)
CALCIUM SERPL-MCNC: 7.9 MG/DL — LOW (ref 8.5–10.1)
CHLORIDE SERPL-SCNC: 101 MMOL/L — SIGNIFICANT CHANGE UP (ref 98–110)
CHLORIDE SERPL-SCNC: 103 MMOL/L — SIGNIFICANT CHANGE UP (ref 98–110)
CO2 SERPL-SCNC: 19 MMOL/L — SIGNIFICANT CHANGE UP (ref 17–32)
CO2 SERPL-SCNC: 22 MMOL/L — SIGNIFICANT CHANGE UP (ref 17–32)
COLOR SPEC: YELLOW — SIGNIFICANT CHANGE UP
CREAT SERPL-MCNC: 0.9 MG/DL — SIGNIFICANT CHANGE UP (ref 0.7–1.5)
CREAT SERPL-MCNC: 1.1 MG/DL — SIGNIFICANT CHANGE UP (ref 0.7–1.5)
DIFF PNL FLD: ABNORMAL
EPI CELLS # UR: 8 /HPF — HIGH (ref 0–5)
GLUCOSE BLDC GLUCOMTR-MCNC: 103 MG/DL — HIGH (ref 70–99)
GLUCOSE BLDC GLUCOMTR-MCNC: 99 MG/DL — SIGNIFICANT CHANGE UP (ref 70–99)
GLUCOSE SERPL-MCNC: 109 MG/DL — HIGH (ref 70–99)
GLUCOSE SERPL-MCNC: 140 MG/DL — HIGH (ref 70–99)
GLUCOSE UR QL: NEGATIVE — SIGNIFICANT CHANGE UP
HCT VFR BLD CALC: 34.7 % — LOW (ref 37–47)
HGB BLD-MCNC: 11.7 G/DL — LOW (ref 12–16)
HYALINE CASTS # UR AUTO: 9 /LPF — HIGH (ref 0–7)
KETONES UR-MCNC: NEGATIVE — SIGNIFICANT CHANGE UP
LEUKOCYTE ESTERASE UR-ACNC: ABNORMAL
MAGNESIUM SERPL-MCNC: 1.9 MG/DL — SIGNIFICANT CHANGE UP (ref 1.8–2.4)
MCHC RBC-ENTMCNC: 28.7 PG — SIGNIFICANT CHANGE UP (ref 27–31)
MCHC RBC-ENTMCNC: 33.7 G/DL — SIGNIFICANT CHANGE UP (ref 32–37)
MCV RBC AUTO: 85 FL — SIGNIFICANT CHANGE UP (ref 81–99)
NITRITE UR-MCNC: POSITIVE
NRBC # BLD: 0 /100 WBCS — SIGNIFICANT CHANGE UP (ref 0–0)
PH UR: 6 — SIGNIFICANT CHANGE UP (ref 5–8)
PHOSPHATE SERPL-MCNC: 4.4 MG/DL — SIGNIFICANT CHANGE UP (ref 2.1–4.9)
PLATELET # BLD AUTO: 186 K/UL — SIGNIFICANT CHANGE UP (ref 130–400)
POTASSIUM SERPL-MCNC: 4 MMOL/L — SIGNIFICANT CHANGE UP (ref 3.5–5)
POTASSIUM SERPL-MCNC: 4.4 MMOL/L — SIGNIFICANT CHANGE UP (ref 3.5–5)
POTASSIUM SERPL-SCNC: 4 MMOL/L — SIGNIFICANT CHANGE UP (ref 3.5–5)
POTASSIUM SERPL-SCNC: 4.4 MMOL/L — SIGNIFICANT CHANGE UP (ref 3.5–5)
PROT UR-MCNC: SIGNIFICANT CHANGE UP
RBC # BLD: 4.08 M/UL — LOW (ref 4.2–5.4)
RBC # FLD: 13.4 % — SIGNIFICANT CHANGE UP (ref 11.5–14.5)
RBC CASTS # UR COMP ASSIST: 7 /HPF — HIGH (ref 0–4)
SODIUM SERPL-SCNC: 137 MMOL/L — SIGNIFICANT CHANGE UP (ref 135–146)
SODIUM SERPL-SCNC: 138 MMOL/L — SIGNIFICANT CHANGE UP (ref 135–146)
SP GR SPEC: 1.02 — SIGNIFICANT CHANGE UP (ref 1.01–1.02)
UROBILINOGEN FLD QL: SIGNIFICANT CHANGE UP
WBC # BLD: 18.02 K/UL — HIGH (ref 4.8–10.8)
WBC # FLD AUTO: 18.02 K/UL — HIGH (ref 4.8–10.8)
WBC UR QL: 421 /HPF — HIGH (ref 0–5)

## 2019-10-23 PROCEDURE — 71045 X-RAY EXAM CHEST 1 VIEW: CPT | Mod: 26

## 2019-10-23 PROCEDURE — 72170 X-RAY EXAM OF PELVIS: CPT | Mod: 26

## 2019-10-23 PROCEDURE — 99233 SBSQ HOSP IP/OBS HIGH 50: CPT

## 2019-10-23 PROCEDURE — 93010 ELECTROCARDIOGRAM REPORT: CPT

## 2019-10-23 RX ORDER — HEPARIN SODIUM 5000 [USP'U]/ML
5000 INJECTION INTRAVENOUS; SUBCUTANEOUS
Refills: 0 | Status: DISCONTINUED | OUTPATIENT
Start: 2019-10-23 | End: 2019-10-25

## 2019-10-23 RX ORDER — MORPHINE SULFATE 50 MG/1
2 CAPSULE, EXTENDED RELEASE ORAL EVERY 6 HOURS
Refills: 0 | Status: DISCONTINUED | OUTPATIENT
Start: 2019-10-23 | End: 2019-10-25

## 2019-10-23 RX ORDER — MORPHINE SULFATE 50 MG/1
2 CAPSULE, EXTENDED RELEASE ORAL
Refills: 0 | Status: DISCONTINUED | OUTPATIENT
Start: 2019-10-23 | End: 2019-10-23

## 2019-10-23 RX ORDER — FERROUS SULFATE 325(65) MG
325 TABLET ORAL DAILY
Refills: 0 | Status: DISCONTINUED | OUTPATIENT
Start: 2019-10-23 | End: 2019-10-25

## 2019-10-23 RX ORDER — ONDANSETRON 8 MG/1
4 TABLET, FILM COATED ORAL EVERY 6 HOURS
Refills: 0 | Status: DISCONTINUED | OUTPATIENT
Start: 2019-10-23 | End: 2019-10-25

## 2019-10-23 RX ORDER — SODIUM CHLORIDE 9 MG/ML
1000 INJECTION, SOLUTION INTRAVENOUS
Refills: 0 | Status: DISCONTINUED | OUTPATIENT
Start: 2019-10-23 | End: 2019-10-25

## 2019-10-23 RX ORDER — ACETAMINOPHEN 500 MG
650 TABLET ORAL EVERY 4 HOURS
Refills: 0 | Status: DISCONTINUED | OUTPATIENT
Start: 2019-10-23 | End: 2019-10-25

## 2019-10-23 RX ORDER — POLYETHYLENE GLYCOL 3350 17 G/17G
17 POWDER, FOR SOLUTION ORAL DAILY
Refills: 0 | Status: DISCONTINUED | OUTPATIENT
Start: 2019-10-23 | End: 2019-10-25

## 2019-10-23 RX ORDER — SENNA PLUS 8.6 MG/1
2 TABLET ORAL AT BEDTIME
Refills: 0 | Status: DISCONTINUED | OUTPATIENT
Start: 2019-10-23 | End: 2019-10-25

## 2019-10-23 RX ORDER — VANCOMYCIN HCL 1 G
1000 VIAL (EA) INTRAVENOUS ONCE
Refills: 0 | Status: COMPLETED | OUTPATIENT
Start: 2019-10-23 | End: 2019-10-23

## 2019-10-23 RX ORDER — SODIUM CHLORIDE 9 MG/ML
1000 INJECTION, SOLUTION INTRAVENOUS
Refills: 0 | Status: DISCONTINUED | OUTPATIENT
Start: 2019-10-23 | End: 2019-10-23

## 2019-10-23 RX ORDER — LOSARTAN POTASSIUM 100 MG/1
100 TABLET, FILM COATED ORAL DAILY
Refills: 0 | Status: DISCONTINUED | OUTPATIENT
Start: 2019-10-23 | End: 2019-10-25

## 2019-10-23 RX ORDER — CHLORHEXIDINE GLUCONATE 213 G/1000ML
1 SOLUTION TOPICAL
Refills: 0 | Status: DISCONTINUED | OUTPATIENT
Start: 2019-10-23 | End: 2019-10-25

## 2019-10-23 RX ORDER — MAGNESIUM HYDROXIDE 400 MG/1
30 TABLET, CHEWABLE ORAL DAILY
Refills: 0 | Status: DISCONTINUED | OUTPATIENT
Start: 2019-10-23 | End: 2019-10-25

## 2019-10-23 RX ORDER — ONDANSETRON 8 MG/1
4 TABLET, FILM COATED ORAL ONCE
Refills: 0 | Status: DISCONTINUED | OUTPATIENT
Start: 2019-10-23 | End: 2019-10-23

## 2019-10-23 RX ORDER — OXYCODONE AND ACETAMINOPHEN 5; 325 MG/1; MG/1
1 TABLET ORAL EVERY 4 HOURS
Refills: 0 | Status: DISCONTINUED | OUTPATIENT
Start: 2019-10-23 | End: 2019-10-25

## 2019-10-23 RX ORDER — SODIUM CHLORIDE 9 MG/ML
500 INJECTION INTRAMUSCULAR; INTRAVENOUS; SUBCUTANEOUS ONCE
Refills: 0 | Status: COMPLETED | OUTPATIENT
Start: 2019-10-23 | End: 2019-10-23

## 2019-10-23 RX ORDER — OXYCODONE AND ACETAMINOPHEN 5; 325 MG/1; MG/1
1 TABLET ORAL ONCE
Refills: 0 | Status: DISCONTINUED | OUTPATIENT
Start: 2019-10-23 | End: 2019-10-23

## 2019-10-23 RX ORDER — LEVOTHYROXINE SODIUM 125 MCG
50 TABLET ORAL DAILY
Refills: 0 | Status: DISCONTINUED | OUTPATIENT
Start: 2019-10-23 | End: 2019-10-25

## 2019-10-23 RX ADMIN — Medication 325 MILLIGRAM(S): at 11:27

## 2019-10-23 RX ADMIN — Medication 650 MILLIGRAM(S): at 17:20

## 2019-10-23 RX ADMIN — MORPHINE SULFATE 2 MILLIGRAM(S): 50 CAPSULE, EXTENDED RELEASE ORAL at 01:26

## 2019-10-23 RX ADMIN — MORPHINE SULFATE 2 MILLIGRAM(S): 50 CAPSULE, EXTENDED RELEASE ORAL at 00:33

## 2019-10-23 RX ADMIN — SODIUM CHLORIDE 50 MILLILITER(S): 9 INJECTION, SOLUTION INTRAVENOUS at 00:41

## 2019-10-23 RX ADMIN — Medication 1 TABLET(S): at 11:27

## 2019-10-23 RX ADMIN — SODIUM CHLORIDE 50 MILLILITER(S): 9 INJECTION, SOLUTION INTRAVENOUS at 05:50

## 2019-10-23 RX ADMIN — Medication 250 MILLIGRAM(S): at 13:38

## 2019-10-23 RX ADMIN — POLYETHYLENE GLYCOL 3350 17 GRAM(S): 17 POWDER, FOR SOLUTION ORAL at 11:28

## 2019-10-23 RX ADMIN — Medication 50 MICROGRAM(S): at 05:49

## 2019-10-23 RX ADMIN — OXYCODONE AND ACETAMINOPHEN 1 TABLET(S): 5; 325 TABLET ORAL at 09:17

## 2019-10-23 RX ADMIN — Medication 650 MILLIGRAM(S): at 17:46

## 2019-10-23 RX ADMIN — SODIUM CHLORIDE 500 MILLILITER(S): 9 INJECTION INTRAMUSCULAR; INTRAVENOUS; SUBCUTANEOUS at 21:44

## 2019-10-23 RX ADMIN — OXYCODONE AND ACETAMINOPHEN 1 TABLET(S): 5; 325 TABLET ORAL at 22:44

## 2019-10-23 RX ADMIN — LOSARTAN POTASSIUM 100 MILLIGRAM(S): 100 TABLET, FILM COATED ORAL at 05:49

## 2019-10-23 RX ADMIN — HEPARIN SODIUM 5000 UNIT(S): 5000 INJECTION INTRAVENOUS; SUBCUTANEOUS at 17:17

## 2019-10-23 RX ADMIN — OXYCODONE AND ACETAMINOPHEN 1 TABLET(S): 5; 325 TABLET ORAL at 14:58

## 2019-10-23 RX ADMIN — OXYCODONE AND ACETAMINOPHEN 1 TABLET(S): 5; 325 TABLET ORAL at 21:13

## 2019-10-23 NOTE — CONSULT NOTE ADULT - SUBJECTIVE AND OBJECTIVE BOX
HPI: Pt is an 85y/o F with PMH GERD, BrCa, JACKELYN-BSO, Tonsillectomy as a child, Tonsillar Ca 2/2 HPV s/p robotic partial glossectomy (BOT) by Dr. Avila at [facility] 11/2018 and subsequent lymph node dissection, recently had PET scan done showing no recurrence of cancer as per pt; ENT consulted to evaluate airway patency for intubation for impending total hip arthroplasty. Currently, pt has no signs of respiratory distress. She admits to having a foreign body sensation as she swallows along the side of her tongue, no difficulty swallowing or choking. Denies any SOB or difficulty breathing at this time.     PAST MEDICAL & SURGICAL HISTORY:  Monoclonal gammopathy  HTN (hypertension)  Right breast cancer with malignant cells in regional lymph nodes no greater than 0.2 mm and no more than 200 cells  Tonsil cancer  History of partial colectomy  Status post right breast lumpectomy  H/O abdominal hysterectomy  History of parathyroid surgery  Capsular cataract of both eyes    Allergies  Biaxin (Unknown)  erythromycin (Unknown)  penicillin (Unknown)    MEDICATIONS  (STANDING):  calcium carbonate 1250 mG  + Vitamin D (OsCal 500 + D) 1 Tablet(s) Oral daily  chlorhexidine 4% Liquid 1 Application(s) Topical <User Schedule>  docusate sodium 100 milliGRAM(s) Oral two times a day  ferrous    sulfate 325 milliGRAM(s) Oral daily  levothyroxine 50 MICROGram(s) Oral daily  losartan 100 milliGRAM(s) Oral daily  multivitamin 1 Tablet(s) Oral daily  polyvinyl alcohol 1.4%/povidone 0.6% Ophthalmic Solution - Peds 1 Drop(s) Both EYES two times a day  sodium chloride 0.9%. 1000 milliLiter(s) (50 mL/Hr) IV Continuous <Continuous>    MEDICATIONS  (PRN):  acetaminophen   Tablet .. 650 milliGRAM(s) Oral every 4 hours PRN Mild Pain (1 - 3)  morphine  - Injectable 2 milliGRAM(s) IV Push every 6 hours PRN Severe Pain (7 - 10)  oxyCODONE    5 mG/acetaminophen 325 mG 1 Tablet(s) Oral every 4 hours PRN Moderate Pain (4 - 6)    ROS:   ENT: all negative except as noted in HPI   CV: denies palpitations  Pulm: denies SOB, cough, hemoptysis  GI: denies change in apetite, indigestion, n/v  : denies pertinent urinary symptoms, urgency  Neuro: denies numbness/tingling, loss of sensation  Psych: denies anxiety  MS: denies muscle weakness, instability  Heme: denies easy bruising or bleeding  Endo: denies heat/cold intolerance, excessive sweating  Vascular: denies LE edema    Vital Signs Last 24 Hrs  T(C): 37.7 (21 Oct 2019 16:04), Max: 37.7 (21 Oct 2019 16:04)  T(F): 99.8 (21 Oct 2019 16:04), Max: 99.8 (21 Oct 2019 16:04)  HR: 92 (21 Oct 2019 16:04) (80 - 101)  BP: 168/70 (21 Oct 2019 16:04) (141/65 - 168/70)  RR: 18 (21 Oct 2019 16:04) (18 - 18)               12.5   13.41 )-----------( 212      ( 21 Oct 2019 05:36 )             36.9    10-21    137  |  98  |  15  ----------------------------<  106<H>  4.1   |  25  |  1.1    Ca    9.3      21 Oct 2019 05:36  Mg     2.0     10-21    TPro  7.4  /  Alb  4.2  /  TBili  0.8  /  DBili  x   /  AST  21  /  ALT  17  /  AlkPhos  104  10-21  PT/INR - ( 20 Oct 2019 14:10 )   PT: 12.50 sec;   INR: 1.09 ratio    PTT - ( 20 Oct 2019 14:10 )  PTT:29.1 sec    PHYSICAL EXAM:  Gen: awake, alert, NAD. No drooling or pooling of secretions.   HEENT: Head NC/AT. Nares bilaterally patent. Oral cavity no erythema/edema. Uvula midline. Tongue wnl, midline, some scarring/discoloration along R margin of tongue. Posterior oropharynx clear.   FFL: Nasopharynx, posterior oropharynx, hypopharynx clear, no edema/exudates noted. VC approximate appropriately.
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 Pt had immediate pain afterwards with inability to ambulate. Pt was on the ground for about 30 min and slid herself to get to the phone. Pt did not hit her head. Pt did not feel dizzy prior or post fall. No generalized shaking body movements. Pt denied LOC, headaches, blurry vision, chest pain, palpitations, SOB, Nausea/vomiting, fever, rash or abdominal pain. Pt denied numbness, tingling or weakness of b/l LE.   ER course: Vitals were /73, HR 99, T 98F, SpO2 99% on RA. XRay hip showed right femur fracture with orthopedics to take patient for surgical intervention tomorrow possibly. (20 Oct 2019 16:09)      PAST MEDICAL & SURGICAL HISTORY:  Monoclonal gammopathy  HTN (hypertension)  Right breast cancer with malignant cells in regional lymph nodes no greater than 0.2 mm and no more than 200 cells  Tonsil cancer  History of partial colectomy  Status post right breast lumpectomy  H/O abdominal hysterectomy  History of parathyroid surgery  Capsular cataract of both eyes      Hospital Course:  She suffereda right hip transcervical fx. she is s/p Santos with dr. Avila today. She amb 5 ft with a walker with mod assist. today.  TODAY'S SUBJECTIVE & REVIEW OF SYMPTOMS:     Constitutional WNL   Cardio WNL   Resp WNL   GI WNL  Heme WNL  Endo WNL  Skin WNL  MSK WNL  Neuro WNL  Cognitive WNL  Psych WNL      MEDICATIONS  (STANDING):  calcium carbonate 1250 mG  + Vitamin D (OsCal 500 + D) 1 Tablet(s) Oral daily  chlorhexidine 2% Cloths 1 Application(s) Topical <User Schedule>  chlorhexidine 4% Liquid 1 Application(s) Topical <User Schedule>  ferrous    sulfate 325 milliGRAM(s) Oral daily  heparin  Injectable 5000 Unit(s) SubCutaneous two times a day  lactated ringers. 1000 milliLiter(s) (50 mL/Hr) IV Continuous <Continuous>  levothyroxine 50 MICROGram(s) Oral daily  losartan 100 milliGRAM(s) Oral daily  multivitamin 1 Tablet(s) Oral daily  polyethylene glycol 3350 17 Gram(s) Oral daily    MEDICATIONS  (PRN):  acetaminophen   Tablet .. 650 milliGRAM(s) Oral every 4 hours PRN Mild Pain (1 - 3)  aluminum hydroxide/magnesium hydroxide/simethicone Suspension 30 milliLiter(s) Oral four times a day PRN Indigestion  magnesium hydroxide Suspension 30 milliLiter(s) Oral daily PRN Constipation  morphine  - Injectable 2 milliGRAM(s) IV Push every 6 hours PRN Severe Pain (7 - 10)  ondansetron Injectable 4 milliGRAM(s) IV Push every 6 hours PRN Nausea and/or Vomiting  oxycodone    5 mG/acetaminophen 325 mG 1 Tablet(s) Oral every 4 hours PRN Moderate Pain (4 - 6)  senna 2 Tablet(s) Oral at bedtime PRN Constipation      FAMILY HISTORY:      Allergies    Biaxin (Unknown)  erythromycin (Unknown)  penicillin (Unknown)    Intolerances        SOCIAL HISTORY:    [  ] Etoh  [  ] Smoking  [  ] Substance abuse     Home Environment:  [  ] Home Alone  [  ] Lives with Family  [  ] Home Health Aid    Dwelling:  [  ] Apartment  [  ] Private House  [  ] Adult Home  [  ] Skilled Nursing Facility      [  ] Short Term  [  ] Long Term  [x  ] Stairs-1 step to enter and a flight to the bedroom       Elevator [  ]    FUNCTIONAL STATUS PTA: (Check all that apply)  Ambulation: [x   ]Independent    [  ] Dependent     [  ] Non-Ambulatory  Assistive Device: [  ] SA Cane  [  ]  Q Cane  [  ] Walker  [  ]  Wheelchair  ADL : [x  ] Independent  [  ]  Dependent       Vital Signs Last 24 Hrs  T(C): 37.2 (23 Oct 2019 10:48), Max: 37.2 (23 Oct 2019 10:48)  T(F): 98.9 (23 Oct 2019 10:48), Max: 98.9 (23 Oct 2019 10:48)  HR: 123 (23 Oct 2019 10:48) (94 - 123)  BP: 137/52 (23 Oct 2019 10:48) (125/58 - 168/66)  BP(mean): 87 (23 Oct 2019 00:04) (87 - 87)  RR: 18 (23 Oct 2019 10:48) (10 - 28)  SpO2: 95% (23 Oct 2019 01:29) (95% - 100%)      PHYSICAL EXAM: Alert & Oriented X3  GENERAL: NAD, well-groomed, well-developed  HEAD:  Atraumatic, Normocephalic  EYES: EOMI, PERRLA, conjunctiva and sclera clear  NECK: Supple, No JVD, Normal thyroid  CHEST/LUNG: Clear to percussion bilaterally; No rales, rhonchi, wheezing, or rubs  HEART: Regular rate and rhythm; No murmurs, rubs, or gallops  ABDOMEN: Soft, Nontender, Nondistended; Bowel sounds present  EXTREMITIES:  2+ Peripheral Pulses, No clubbing, cyanosis, or edema    NERVOUS SYSTEM:  Cranial Nerves 2-12 intact [  ] Abnormal  [  ]  ROM: WFL all extremities [  ]  Abnormal [  ]  Motor Strength: WFL all extremities  [  ]  Abnormal [  ]   good ankle DF bilat  Sensation: intact to light touch [  ] Abnormal [  ]  Reflexes: Symmetric [  ]  Abnormal [  ]    FUNCTIONAL STATUS:  Bed Mobility: Independent [  ]  Supervision [  ]  Needs Assistance [  ]  N/A [  ]  Transfers: Independent [  ]  Supervision [  ]  Needs Assistance [  ]  N/A [  ]   Ambulation: Independent [  ]  Supervision [  ]  Needs Assistance [  ]  N/A [  ]  ADL: Independent [  ] Requires Assistance [  ] N/A [  ]      LABS:                        11.7   18.02 )-----------( 186      ( 23 Oct 2019 04:18 )             34.7     10-23    138  |  103  |  20  ----------------------------<  140<H>  4.4   |  22  |  1.1    Ca    7.6<L>      23 Oct 2019 04:18  Phos  4.4     10-23  Mg     1.9     10-23            RADIOLOGY & ADDITIONAL STUDIES:    Assesment:
Orthopedics Consult Note:    This is a 84y Female who presents to the ED today with c/o right hip pain and inability to bear weight s/p mechanical fall today. Pt states she was vacuuming and tripped over the cord and fell onto her right side. She had immediate pain and inability to ambulate. Pt denies any other injuries. Pt denies head trauma or LOC. Pt denies any numbness, tingling or parethesias. Pt denies fever or chills.    Past Medical & Surgical History:  ^FALL  MEWS Score  HTN (hypertension)  Right breast cancer with malignant cells in regional lymph nodes no greater than 0.2 mm and no more than 200 cells  Tonsil cancer  Closed fracture of right hip, initial encounter  Status post right breast lumpectomy  H/O abdominal hysterectomy  History of parathyroid surgery  Capsular cataract of both eyes  FALL    Allergies:  Biaxin (Unknown)  erythromycin (Unknown)  penicillin (Unknown)    Vital Signs:  T(C): 36.6 (10-20-19 @ 15:26), Max: 36.7 (10-20-19 @ 13:57)  HR: 95 (10-20-19 @ 15:26) (95 - 99)  BP: 156/75 (10-20-19 @ 15:26) (156/75 - 180/73)  RR: 20 (10-20-19 @ 15:26) (18 - 20)  SpO2: 97% (10-20-19 @ 15:26) (97% - 99%)    Labs:                        13.5   24.69 )-----------( 236      ( 20 Oct 2019 14:10 )             40.4   10-20    136  |  97<L>  |  20  ----------------------------<  112<H>  3.9   |  24  |  1.0    Ca    9.4      20 Oct 2019 14:10    TPro  8.6<H>  /  Alb  4.7  /  TBili  0.4  /  DBili  x   /  AST  22  /  ALT  18  /  AlkPhos  115  10-20  PT/INR - ( 20 Oct 2019 14:10 )   PT: 12.50 sec;   INR: 1.09 ratio         PTT - ( 20 Oct 2019 14:10 )  PTT:29.1 sec    X-rays of the pelvis, right hip and femur demonstrate femoral neck hip fracture.    PE right hip:  +mild swelling, no ecchymosis, no erythema, skin intact, normothermic. +TTP over groin. LROM 2' pain. Moving all toes, sensation intact. DP and PT pulses 2+.    Secondary Survey:  Left knee, ankle and B/L UEs with no swelling, no ecchymoses, no abrasions, no lacerations or any other signs of injury with full painless baseline ROM and no bony TTP. Able to SLR LLE. Sensation intact distally, moving all digits. Distal pulses intact.     Assessment:  84y Female with a right femoral neck hip fracture s/p mechanical fall today.    Plan:  Pt and family advised that surgical fixation of right hip fracture with hemiarthroplasty vs total hip arthroplasty is necessary.  Surgical procedure discussed in detail with pt and family including all R/B/As; Pt and Pts family expressed understanding and consents for surgery obtained.  Admit to Medical service for optimization and medical clearance.  f/u medical clearance.  f/u labs/imaging. Please obtain CT pelvis  Complete bedrest.  Pain control.  NPO and hold chemical anticoagulation after midnight for possible OR tomorrow pending medical optimization and clearance.   DVT ppx with SCDs for now.

## 2019-10-23 NOTE — PHYSICAL THERAPY INITIAL EVALUATION ADULT - PERTINENT HX OF CURRENT PROBLEM, REHAB EVAL
Pt is an 85y/o, female admitted with CC: Right hip pain and inability to ambulate s/p mechanical fall

## 2019-10-23 NOTE — CHART NOTE - NSCHARTNOTEFT_GEN_A_CORE
PACU ANESTHESIA ADMISSION NOTE      Procedure: Insertion of endoprosthesis for fracture of hip    Post op diagnosis:  Closed displaced fracture of right femoral neck      ____  Intubated  TV:______       Rate: ______      FiO2: ______    __x__  Patent Airway    ____  Full return of protective reflexes    ___x_  Full recovery from anesthesia / back to baseline status    Vitals:  T(F): 98.5  HR: 122  BP: 153/62  RR: 22  SpO2: 99%    Mental Status:  __x__ Awake   __x___ Alert   _____ Drowsy   _____ Sedated    Nausea/Vomiting:  _x___ NO  ______Yes,   See Post - Op Orders          Pain Scale (0-10):  _____    Treatment: ____ None    _x__ See Post - Op/PCA Orders    Post - Operative Fluids:   ____ Oral   _x___ See Post - Op Orders    Plan: Discharge:   ____Home       ____x_Floor     _____Critical Care    _____  Other:_________________    Comments: Patient tolerated procedure  No anesthesia complications  Transfer to PACU

## 2019-10-23 NOTE — PROGRESS NOTE ADULT - ASSESSMENT
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    #Acute right femoral neck transcervical fracture s/p mechanical fall  -Xray showed Acute right femoral neck transcervical fracture in varus angulation with severe right hip degenerative change and joint space narrowing. Moderate left hip osteoarthritis present.  -Ortho: Surgical fixation of right hip fracture with hemiarthroplasty vs total hip arthroplasty-  -s/p hemiarthroplasty EBL 400cc  -continue to trend Hbg transfuse <7 or if symptomatic   -maintain NWB to RLE  *Medically patient is at moderate risk for surgical intervention.*  -CT pelvis: acute right femoral transcervical fracture with varus angulation   - Pain control prn.   -PT/Rehab post surgical intervention     #HTN-c/w losartan 100mg  (converted from irbesartan); DASH diet  #Hypothyroidism-c/w Synthroid  #Breast Ca s/p Lumpectomy- Stable    #DVT ppx: SCDs for now  #GI ppx: not indicated  #Diet: DASH; NPO currently for OR  #Activity: Bedrest for now  #CHG  #FULL code  #Dispo: SNF

## 2019-10-23 NOTE — BRIEF OPERATIVE NOTE - NSICDXBRIEFPOSTOP_GEN_ALL_CORE_FT
POST-OP DIAGNOSIS:  Closed displaced fracture of right femoral neck 23-Oct-2019 00:06:06  Danial Avila

## 2019-10-23 NOTE — OCCUPATIONAL THERAPY INITIAL EVALUATION ADULT - PLANNED THERAPY INTERVENTIONS, OT EVAL
stretching/ADL retraining/bed mobility training/strengthening/IADL retraining/balance training/parent/caregiver training.../transfer training

## 2019-10-23 NOTE — PROGRESS NOTE ADULT - SUBJECTIVE AND OBJECTIVE BOX
ORTHO PROGRESS NOTE     84yFemale POD #1 S/P hemiarthroplasty right hip    Patient seen and examined at bedside . The patient is awake and alert in NAD. No complaints of chest pain, SOB, N/V.    MEDICATIONS  (STANDING):  calcium carbonate 1250 mG  + Vitamin D (OsCal 500 + D) 1 Tablet(s) Oral daily  chlorhexidine 2% Cloths 1 Application(s) Topical <User Schedule>  chlorhexidine 4% Liquid 1 Application(s) Topical <User Schedule>  ferrous    sulfate 325 milliGRAM(s) Oral daily  heparin  Injectable 5000 Unit(s) SubCutaneous two times a day  lactated ringers. 1000 milliLiter(s) (50 mL/Hr) IV Continuous <Continuous>  levothyroxine 50 MICROGram(s) Oral daily  losartan 100 milliGRAM(s) Oral daily  multivitamin 1 Tablet(s) Oral daily  polyethylene glycol 3350 17 Gram(s) Oral daily    MEDICATIONS  (PRN):  acetaminophen   Tablet .. 650 milliGRAM(s) Oral every 4 hours PRN Mild Pain (1 - 3)  aluminum hydroxide/magnesium hydroxide/simethicone Suspension 30 milliLiter(s) Oral four times a day PRN Indigestion  magnesium hydroxide Suspension 30 milliLiter(s) Oral daily PRN Constipation  morphine  - Injectable 2 milliGRAM(s) IV Push every 6 hours PRN Severe Pain (7 - 10)  ondansetron Injectable 4 milliGRAM(s) IV Push every 6 hours PRN Nausea and/or Vomiting  oxycodone    5 mG/acetaminophen 325 mG 1 Tablet(s) Oral every 4 hours PRN Moderate Pain (4 - 6)  senna 2 Tablet(s) Oral at bedtime PRN Constipation      Vital Signs Last 24 Hrs  T(C): 37.2 (23 Oct 2019 10:48), Max: 37.2 (23 Oct 2019 10:48)  T(F): 98.9 (23 Oct 2019 10:48), Max: 98.9 (23 Oct 2019 10:48)  HR: 123 (23 Oct 2019 10:48) (86 - 123)  BP: 137/52 (23 Oct 2019 10:48) (125/58 - 178/76)  BP(mean): 87 (23 Oct 2019 00:04) (87 - 87)  RR: 18 (23 Oct 2019 10:48) (10 - 28)  SpO2: 95% (23 Oct 2019 01:29) (95% - 100%)    PE:   NAD  Non labored respirations  RLE  Dressing C/D/I   Compartments soft and compressible  Motor intact distally  SILT distally  CR<2sec  palpable pulses                          11.7   18.02 )-----------( 186      ( 23 Oct 2019 04:18 )             34.7     10-23    138  |  103  |  20  ----------------------------<  140<H>  4.4   |  22  |  1.1    Ca    7.6<L>      23 Oct 2019 04:18  Phos  4.4     10-23  Mg     1.9     10-23      10-22-19 @ 07:01  -  10-23-19 @ 07:00  --------------------------------------------------------  IN: 50 mL / OUT: 770 mL / NET: -720 mL    10-23-19 @ 07:01  -  10-23-19 @ 14:18  --------------------------------------------------------  IN: 480 mL / OUT: 0 mL / NET: 480 mL    A/P: 84yFemale POD #1 S/P  right hip hemiarthroplasty  OOB to Chair   WBAT  PT/OT  Pain control   Ext icing/elevation  Incentive Spirometry   DVT Prophylaxis   Discharge planning

## 2019-10-23 NOTE — CONSULT NOTE ADULT - ASSESSMENT
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     - Will d/w attending during AM rounds
IMPRESSION: Rehab of   right hip transcervical fx, s/p HA    PRECAUTIONS: [  ] Cardiac  [  ] Respiratory  [  ] Seizures [  ] Contact Isolation  [  ] Droplet Isolation  [  ] Other    Weight Bearing Status: right posterior hip dislocation precautions; WBAT RLE    RECOMMENDATION:    Out of Bed to Chair     DVT/Decubiti Prophylaxis    REHAB PLAN:     [ xx  ] Bedside P/T 3-5 times a week   [   ]   Bedside O/T  2-3 times a week             [   ] No Rehab Therapy Indicated                   [   ]  Speech Therapy   Conditioning/ROM                                    ADL  Bed Mobility                                               Conditioning/ROM  Transfers                                                     Bed Mobility  Sitting /Standing Balance                         Transfers                                        Gait Training                                               Sitting/Standing Balance  Stair Training [   ]Applicable                    Home equipment Eval                                                                        Splinting  [   ] Only      GOALS:   ADL   [ x  ]   Independent                    Transfers  [x   ] Independent                          Ambulation  [ x  ] Independent     [    ] With device                            [   ]  CG                                                         [   ]  CG                                                                  [   ] CG                            [    ] Min A                                                   [   ] Min A                                                              [   ] Min  A          DISCHARGE PLAN:   [  xx ]  Good candidate for Intensive Rehabilitation/Hospital based-[facility]A [facility]                                             Will tolerate 3hrs Intensive Rehab Daily                                       [    ]  Short Term Rehab in Skilled Nursing Facility                                       [    ]  Home with Outpatient or  services                                         [    ]  Possible Candidate for Intensive Hospital based Rehab

## 2019-10-23 NOTE — PHYSICAL THERAPY INITIAL EVALUATION ADULT - GENERAL OBSERVATIONS, REHAB EVAL
Pt encountered supine in bed in NAD, no c/o pain and agreeable to participate with PT. (+) abd wedge, (+) IV, (+) correa. Pt requires Mod A in bed mobility, Mod A in transfer mobility and Mod A in amb ~5 ft using RW. Pt demonstrates limited ambulation secondary decreased standing tolerance.

## 2019-10-23 NOTE — PROGRESS NOTE ADULT - SUBJECTIVE AND OBJECTIVE BOX
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  No new complaints    Vital Signs Last 24 Hrs  T(C): 37.2 (23 Oct 2019 10:48), Max: 37.2 (23 Oct 2019 10:48)  T(F): 98.9 (23 Oct 2019 10:48), Max: 98.9 (23 Oct 2019 10:48)  HR: 123 (23 Oct 2019 10:48) (86 - 123)  BP: 137/52 (23 Oct 2019 10:48) (125/58 - 178/76)  BP(mean): 87 (23 Oct 2019 00:04) (87 - 87)  RR: 18 (23 Oct 2019 10:48) (10 - 28)  SpO2: 95% (23 Oct 2019 01:29) (95% - 100%)    Physical Examination:  General: AAO x 3  HEENT: PERRLA, EOMI  CV= S1 & S2 appreciated  Lungs=CTA BL  Abdominal Examination= + BS, Soft, NT/ND  Extremity Examination= No C/C/E    ROS: No chest pain, no shortness of breath.  All other systems reviewed and are within normal limits except for the complaints in the HPI.    MEDICATIONS  (STANDING):  calcium carbonate 1250 mG  + Vitamin D (OsCal 500 + D) 1 Tablet(s) Oral daily  chlorhexidine 2% Cloths 1 Application(s) Topical <User Schedule>  chlorhexidine 4% Liquid 1 Application(s) Topical <User Schedule>  ferrous    sulfate 325 milliGRAM(s) Oral daily  lactated ringers. 1000 milliLiter(s) (50 mL/Hr) IV Continuous <Continuous>  levothyroxine 50 MICROGram(s) Oral daily  losartan 100 milliGRAM(s) Oral daily  multivitamin 1 Tablet(s) Oral daily  polyethylene glycol 3350 17 Gram(s) Oral daily  vancomycin  IVPB 1000 milliGRAM(s) IV Intermittent once    MEDICATIONS  (PRN):  acetaminophen   Tablet .. 650 milliGRAM(s) Oral every 4 hours PRN Mild Pain (1 - 3)  aluminum hydroxide/magnesium hydroxide/simethicone Suspension 30 milliLiter(s) Oral four times a day PRN Indigestion  magnesium hydroxide Suspension 30 milliLiter(s) Oral daily PRN Constipation  morphine  - Injectable 2 milliGRAM(s) IV Push every 6 hours PRN Severe Pain (7 - 10)  ondansetron Injectable 4 milliGRAM(s) IV Push every 6 hours PRN Nausea and/or Vomiting  oxycodone    5 mG/acetaminophen 325 mG 1 Tablet(s) Oral every 4 hours PRN Moderate Pain (4 - 6)  senna 2 Tablet(s) Oral at bedtime PRN Constipation                         11.7   18.02 )-----------( 186      ( 23 Oct 2019 04:18 )             34.7     10-23    138  |  103  |  20  ----------------------------<  140<H>  4.4   |  22  |  1.1    Ca    7.6<L>      23 Oct 2019 04:18  Phos  4.4     10-23  Mg     1.9     10-23    Case discussed with housestaff & family  MARIUM Adam [id number]

## 2019-10-23 NOTE — BRIEF OPERATIVE NOTE - NSICDXBRIEFPREOP_GEN_ALL_CORE_FT
PRE-OP DIAGNOSIS:  Closed displaced fracture of right femoral neck 23-Oct-2019 00:05:39  Danial Avila

## 2019-10-23 NOTE — PROGRESS NOTE ADULT - ASSESSMENT
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    Fall complicated by right hip fracture: status post right hemiarthroplasty.  Mild acute blood loss noted with hemoglobin decreasing to 11.7.  Orthopedics follow-up appreciated; the patient can be WBAT to the RLE.  Switch IV Morphine to PO Percocet for pain. Continue colace to prevent constipation.  PT evaluation requested to determine future disposition.  Discontinue IV fluids.    Hypertension: blood pressure stable on Cozaar 100mg PO q24  Hypothyroidism: continue Synthroid as directed  GI/DVT prophylaxis    #Progress Note Handoff    Pending:    Tests: CBC in AM  Consults: Patient walked five feet with rolling walker.    Future Disposition: To be determined

## 2019-10-24 LAB
ALBUMIN SERPL ELPH-MCNC: 3 G/DL — LOW (ref 3.5–5.2)
ALP SERPL-CCNC: 96 U/L — SIGNIFICANT CHANGE UP (ref 30–115)
ALT FLD-CCNC: 24 U/L — SIGNIFICANT CHANGE UP (ref 0–41)
ANION GAP SERPL CALC-SCNC: 10 MMOL/L — SIGNIFICANT CHANGE UP (ref 7–14)
ANION GAP SERPL CALC-SCNC: 11 MMOL/L — SIGNIFICANT CHANGE UP (ref 7–14)
ANISOCYTOSIS BLD QL: SLIGHT — SIGNIFICANT CHANGE UP
APTT BLD: 27.6 SEC — SIGNIFICANT CHANGE UP (ref 27–39.2)
AST SERPL-CCNC: 24 U/L — SIGNIFICANT CHANGE UP (ref 0–41)
BASOPHILS # BLD AUTO: 0 K/UL — SIGNIFICANT CHANGE UP (ref 0–0.2)
BASOPHILS NFR BLD AUTO: 0 % — SIGNIFICANT CHANGE UP (ref 0–1)
BILIRUB SERPL-MCNC: 0.3 MG/DL — SIGNIFICANT CHANGE UP (ref 0.2–1.2)
BUN SERPL-MCNC: 25 MG/DL — HIGH (ref 10–20)
BUN SERPL-MCNC: 25 MG/DL — HIGH (ref 10–20)
CALCIUM SERPL-MCNC: 7.3 MG/DL — LOW (ref 8.5–10.1)
CALCIUM SERPL-MCNC: 7.8 MG/DL — LOW (ref 8.5–10.1)
CHLORIDE SERPL-SCNC: 100 MMOL/L — SIGNIFICANT CHANGE UP (ref 98–110)
CHLORIDE SERPL-SCNC: 101 MMOL/L — SIGNIFICANT CHANGE UP (ref 98–110)
CO2 SERPL-SCNC: 23 MMOL/L — SIGNIFICANT CHANGE UP (ref 17–32)
CO2 SERPL-SCNC: 25 MMOL/L — SIGNIFICANT CHANGE UP (ref 17–32)
CREAT SERPL-MCNC: 1.1 MG/DL — SIGNIFICANT CHANGE UP (ref 0.7–1.5)
CREAT SERPL-MCNC: 1.1 MG/DL — SIGNIFICANT CHANGE UP (ref 0.7–1.5)
EOSINOPHIL # BLD AUTO: 0.13 K/UL — SIGNIFICANT CHANGE UP (ref 0–0.7)
EOSINOPHIL NFR BLD AUTO: 0.9 % — SIGNIFICANT CHANGE UP (ref 0–8)
GIANT PLATELETS BLD QL SMEAR: PRESENT — SIGNIFICANT CHANGE UP
GLUCOSE SERPL-MCNC: 135 MG/DL — HIGH (ref 70–99)
GLUCOSE SERPL-MCNC: 139 MG/DL — HIGH (ref 70–99)
HCT VFR BLD CALC: 27.4 % — LOW (ref 37–47)
HCT VFR BLD CALC: 30.2 % — LOW (ref 37–47)
HGB BLD-MCNC: 10.1 G/DL — LOW (ref 12–16)
HGB BLD-MCNC: 9.2 G/DL — LOW (ref 12–16)
INR BLD: 1.28 RATIO — SIGNIFICANT CHANGE UP (ref 0.65–1.3)
LYMPHOCYTES # BLD AUTO: 1.82 K/UL — SIGNIFICANT CHANGE UP (ref 1.2–3.4)
LYMPHOCYTES # BLD AUTO: 13.1 % — LOW (ref 20.5–51.1)
MAGNESIUM SERPL-MCNC: 2.3 MG/DL — SIGNIFICANT CHANGE UP (ref 1.8–2.4)
MANUAL SMEAR VERIFICATION: SIGNIFICANT CHANGE UP
MCHC RBC-ENTMCNC: 28.7 PG — SIGNIFICANT CHANGE UP (ref 27–31)
MCHC RBC-ENTMCNC: 28.8 PG — SIGNIFICANT CHANGE UP (ref 27–31)
MCHC RBC-ENTMCNC: 33.4 G/DL — SIGNIFICANT CHANGE UP (ref 32–37)
MCHC RBC-ENTMCNC: 33.6 G/DL — SIGNIFICANT CHANGE UP (ref 32–37)
MCV RBC AUTO: 85.4 FL — SIGNIFICANT CHANGE UP (ref 81–99)
MCV RBC AUTO: 86 FL — SIGNIFICANT CHANGE UP (ref 81–99)
MICROCYTES BLD QL: SLIGHT — SIGNIFICANT CHANGE UP
MONOCYTES # BLD AUTO: 0.98 K/UL — HIGH (ref 0.1–0.6)
MONOCYTES NFR BLD AUTO: 7 % — SIGNIFICANT CHANGE UP (ref 1.7–9.3)
NEUTROPHILS # BLD AUTO: 10.88 K/UL — HIGH (ref 1.4–6.5)
NEUTROPHILS NFR BLD AUTO: 78.1 % — HIGH (ref 42.2–75.2)
NRBC # BLD: 0 /100 WBCS — SIGNIFICANT CHANGE UP (ref 0–0)
PHOSPHATE SERPL-MCNC: 3.4 MG/DL — SIGNIFICANT CHANGE UP (ref 2.1–4.9)
PLAT MORPH BLD: NORMAL — SIGNIFICANT CHANGE UP
PLATELET # BLD AUTO: 192 K/UL — SIGNIFICANT CHANGE UP (ref 130–400)
PLATELET # BLD AUTO: 233 K/UL — SIGNIFICANT CHANGE UP (ref 130–400)
POIKILOCYTOSIS BLD QL AUTO: SLIGHT — SIGNIFICANT CHANGE UP
POTASSIUM SERPL-MCNC: 4.4 MMOL/L — SIGNIFICANT CHANGE UP (ref 3.5–5)
POTASSIUM SERPL-MCNC: 4.5 MMOL/L — SIGNIFICANT CHANGE UP (ref 3.5–5)
POTASSIUM SERPL-SCNC: 4.4 MMOL/L — SIGNIFICANT CHANGE UP (ref 3.5–5)
POTASSIUM SERPL-SCNC: 4.5 MMOL/L — SIGNIFICANT CHANGE UP (ref 3.5–5)
PROT SERPL-MCNC: 5.7 G/DL — LOW (ref 6–8)
PROTHROM AB SERPL-ACNC: 14.7 SEC — HIGH (ref 9.95–12.87)
RBC # BLD: 3.21 M/UL — LOW (ref 4.2–5.4)
RBC # BLD: 3.51 M/UL — LOW (ref 4.2–5.4)
RBC # FLD: 13.8 % — SIGNIFICANT CHANGE UP (ref 11.5–14.5)
RBC # FLD: 14.1 % — SIGNIFICANT CHANGE UP (ref 11.5–14.5)
RBC BLD AUTO: ABNORMAL
SODIUM SERPL-SCNC: 135 MMOL/L — SIGNIFICANT CHANGE UP (ref 135–146)
SODIUM SERPL-SCNC: 135 MMOL/L — SIGNIFICANT CHANGE UP (ref 135–146)
VARIANT LYMPHS # BLD: 0.9 % — SIGNIFICANT CHANGE UP (ref 0–5)
WBC # BLD: 13.93 K/UL — HIGH (ref 4.8–10.8)
WBC # BLD: 15.32 K/UL — HIGH (ref 4.8–10.8)
WBC # FLD AUTO: 13.93 K/UL — HIGH (ref 4.8–10.8)
WBC # FLD AUTO: 15.32 K/UL — HIGH (ref 4.8–10.8)

## 2019-10-24 PROCEDURE — 99233 SBSQ HOSP IP/OBS HIGH 50: CPT

## 2019-10-24 RX ORDER — CIPROFLOXACIN LACTATE 400MG/40ML
400 VIAL (ML) INTRAVENOUS EVERY 12 HOURS
Refills: 0 | Status: DISCONTINUED | OUTPATIENT
Start: 2019-10-24 | End: 2019-10-25

## 2019-10-24 RX ORDER — PANTOPRAZOLE SODIUM 20 MG/1
40 TABLET, DELAYED RELEASE ORAL ONCE
Refills: 0 | Status: COMPLETED | OUTPATIENT
Start: 2019-10-24 | End: 2019-10-24

## 2019-10-24 RX ORDER — PANTOPRAZOLE SODIUM 20 MG/1
40 TABLET, DELAYED RELEASE ORAL
Refills: 0 | Status: DISCONTINUED | OUTPATIENT
Start: 2019-10-25 | End: 2019-10-25

## 2019-10-24 RX ADMIN — SODIUM CHLORIDE 50 MILLILITER(S): 9 INJECTION, SOLUTION INTRAVENOUS at 10:32

## 2019-10-24 RX ADMIN — OXYCODONE AND ACETAMINOPHEN 1 TABLET(S): 5; 325 TABLET ORAL at 02:25

## 2019-10-24 RX ADMIN — Medication 200 MILLIGRAM(S): at 05:23

## 2019-10-24 RX ADMIN — OXYCODONE AND ACETAMINOPHEN 1 TABLET(S): 5; 325 TABLET ORAL at 07:44

## 2019-10-24 RX ADMIN — Medication 325 MILLIGRAM(S): at 12:04

## 2019-10-24 RX ADMIN — HEPARIN SODIUM 5000 UNIT(S): 5000 INJECTION INTRAVENOUS; SUBCUTANEOUS at 18:03

## 2019-10-24 RX ADMIN — LOSARTAN POTASSIUM 100 MILLIGRAM(S): 100 TABLET, FILM COATED ORAL at 05:23

## 2019-10-24 RX ADMIN — OXYCODONE AND ACETAMINOPHEN 1 TABLET(S): 5; 325 TABLET ORAL at 14:01

## 2019-10-24 RX ADMIN — PANTOPRAZOLE SODIUM 40 MILLIGRAM(S): 20 TABLET, DELAYED RELEASE ORAL at 18:05

## 2019-10-24 RX ADMIN — POLYETHYLENE GLYCOL 3350 17 GRAM(S): 17 POWDER, FOR SOLUTION ORAL at 12:04

## 2019-10-24 RX ADMIN — HEPARIN SODIUM 5000 UNIT(S): 5000 INJECTION INTRAVENOUS; SUBCUTANEOUS at 05:23

## 2019-10-24 RX ADMIN — OXYCODONE AND ACETAMINOPHEN 1 TABLET(S): 5; 325 TABLET ORAL at 08:15

## 2019-10-24 RX ADMIN — Medication 30 MILLILITER(S): at 02:25

## 2019-10-24 RX ADMIN — Medication 200 MILLIGRAM(S): at 18:03

## 2019-10-24 RX ADMIN — Medication 1 TABLET(S): at 12:04

## 2019-10-24 RX ADMIN — OXYCODONE AND ACETAMINOPHEN 1 TABLET(S): 5; 325 TABLET ORAL at 14:20

## 2019-10-24 RX ADMIN — Medication 50 MICROGRAM(S): at 05:23

## 2019-10-24 RX ADMIN — OXYCODONE AND ACETAMINOPHEN 1 TABLET(S): 5; 325 TABLET ORAL at 23:09

## 2019-10-24 NOTE — PROGRESS NOTE ADULT - SUBJECTIVE AND OBJECTIVE BOX
YEIMY FUENTES  84y, Female  Allergy: Biaxin (Unknown)  erythromycin (Unknown)  penicillin (Unknown)    Hospital Day: 4d    Patient seen and examined earlier today. Tachycardic and febrile overnight. Pt denies any symptoms though.    PMH/PSH:  PAST MEDICAL & SURGICAL HISTORY:  Monoclonal gammopathy  HTN (hypertension)  Right breast cancer with malignant cells in regional lymph nodes no greater than 0.2 mm and no more than 200 cells  Tonsil cancer  History of partial colectomy  Status post right breast lumpectomy  H/O abdominal hysterectomy  History of parathyroid surgery  Capsular cataract of both eyes    VITALS:  T(F): 96.6 (10-24-19 @ 07:40), Max: 100.9 (10-23-19 @ 17:26)  HR: 98 (10-24-19 @ 07:40)  BP: 112/58 (10-24-19 @ 07:40) (112/55 - 145/65)  RR: 18 (10-24-19 @ 07:40)  SpO2: --    TESTS & MEASUREMENTS:  Weight (Kg):   BMI (kg/m2): 21.6 (10-21)    10-22-19 @ 07:01  -  10-23-19 @ 07:00  --------------------------------------------------------  IN: 50 mL / OUT: 770 mL / NET: -720 mL    10-23-19 @ 07:  -  10-24-19 @ 07:00  --------------------------------------------------------  IN: 960 mL / OUT: 1550 mL / NET: -590 mL    10-24-19 @ 07:01  -  10-24-19 @ 11:12  --------------------------------------------------------  IN: 480 mL / OUT: 0 mL / NET: 480 mL                        10.1   15.32 )-----------( 233      ( 24 Oct 2019 04:41 )             30.2     PT/INR - ( 24 Oct 2019 01:07 )   PT: 14.70 sec;   INR: 1.28 ratio      PTT - ( 24 Oct 2019 01:07 )  PTT:27.6 sec  10-24    135  |  100  |  25<H>  ----------------------------<  139<H>  4.5   |  25  |  1.1    Ca    7.8<L>      24 Oct 2019 04:41  Phos  3.4     10-24  Mg     2.3     10-24    TPro  5.7<L>  /  Alb  3.0<L>  /  TBili  0.3  /  DBili  x   /  AST  24  /  ALT  24  /  AlkPhos  96  10-24    LIVER FUNCTIONS - ( 24 Oct 2019 01:07 )  Alb: 3.0 g/dL / Pro: 5.7 g/dL / ALK PHOS: 96 U/L / ALT: 24 U/L / AST: 24 U/L / GGT: x           Urinalysis Basic - ( 23 Oct 2019 21:39 )    Color: Yellow / Appearance: Turbid / S.018 / pH: x  Gluc: x / Ketone: Negative  / Bili: Negative / Urobili: <2 mg/dL   Blood: x / Protein: Trace / Nitrite: Positive   Leuk Esterase: Large / RBC: 7 /HPF /  /HPF   Sq Epi: x / Non Sq Epi: 8 /HPF / Bacteria: Many    RECENT DIAGNOSTIC ORDERS:  Culture - Urine: Routine  Specimen Source: Catheterized  Addl Info: If indwelling urinary catheter > 14 days, obtain an order to remove and replace prior to c (10-23-19 @ 21:19)  Culture - Blood: 23:30  Specimen Source: Blood (10-23-19 @ 22:22)  Complete Blood Count: AM Sched. Collection: 25-Oct-2019 04:30 (10-24-19 @ 09:34)  Basic Metabolic Panel: AM Sched. Collection: 25-Oct-2019 04:30 (10-24-19 @ 09:34)  Phosphorus Level, Serum: AM Sched. Collection: 25-Oct-2019 04:30 (10-24-19 @ 09:34)  Magnesium, Serum: AM Sched. Collection: 25-Oct-2019 04:30 (10-24-19 @ 09:34)    MEDICATIONS:  MEDICATIONS  (STANDING):  calcium carbonate 1250 mG  + Vitamin D (OsCal 500 + D) 1 Tablet(s) Oral daily  chlorhexidine 2% Cloths 1 Application(s) Topical <User Schedule>  chlorhexidine 4% Liquid 1 Application(s) Topical <User Schedule>  ciprofloxacin   IVPB 400 milliGRAM(s) IV Intermittent every 12 hours  ferrous    sulfate 325 milliGRAM(s) Oral daily  heparin  Injectable 5000 Unit(s) SubCutaneous two times a day  lactated ringers. 1000 milliLiter(s) (50 mL/Hr) IV Continuous <Continuous>  levothyroxine 50 MICROGram(s) Oral daily  losartan 100 milliGRAM(s) Oral daily  multivitamin 1 Tablet(s) Oral daily  polyethylene glycol 3350 17 Gram(s) Oral daily    MEDICATIONS  (PRN):  acetaminophen   Tablet .. 650 milliGRAM(s) Oral every 4 hours PRN Mild Pain (1 - 3)  aluminum hydroxide/magnesium hydroxide/simethicone Suspension 30 milliLiter(s) Oral four times a day PRN Indigestion  magnesium hydroxide Suspension 30 milliLiter(s) Oral daily PRN Constipation  morphine  - Injectable 2 milliGRAM(s) IV Push every 6 hours PRN Severe Pain (7 - 10)  ondansetron Injectable 4 milliGRAM(s) IV Push every 6 hours PRN Nausea and/or Vomiting  oxycodone    5 mG/acetaminophen 325 mG 1 Tablet(s) Oral every 4 hours PRN Moderate Pain (4 - 6)  senna 2 Tablet(s) Oral at bedtime PRN Constipation    HOME MEDICATIONS:  Colace 60 mg/15 mL oral syrup (10-20)  D3 with Calcium 25 mcg (1000 intl units)-90 mg oral tablet (10-20)  ferrous sulfate 324 mg (65 mg elemental iron) oral tablet (10-20)  irbesartan 300 mg oral tablet (10-20)  Multiple Vitamins oral capsule (10-20)  Synthroid 50 mcg (0.05 mg) oral tablet (10-20)  Systane ophthalmic solution (10-20)  Vitamin B12 100 mcg oral tablet (10-20)    REVIEW OF SYSTEMS:  All other review of systems is negative unless indicated above.     PHYSICAL EXAM:  GENERAL: NAD  HEENT: No Swelling  CHEST/LUNG: Good air entry, No wheezing  HEART: RRR, No murmurs  ABDOMEN: Soft, Bowel sounds present  EXTREMITIES:  No clubbing

## 2019-10-24 NOTE — PROGRESS NOTE ADULT - ASSESSMENT
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    Interval events: patient febrile overnight 100.4, tachy 110-115, sat 94-98% on room air, UA positive, urine/blood culture pending. Pt afebrile this AM, HR 93, pulse ox 98% on room air.     #Sepsis 2/2 UTI-sepsis resolved   -UA +nitrites, leuk emily, wbc  -pending urine culture, blood culture  -c/w IV abx    #Acute right femoral neck transcervical fracture s/p mechanical fall  -Xray showed Acute right femoral neck transcervical fracture in varus angulation with severe right hip degenerative change and joint space narrowing. Moderate left hip osteoarthritis present.  -Ortho: Surgical fixation of right hip fracture with hemiarthroplasty vs total hip arthroplasty-  -s/p hemiarthroplasty EBL 400cc  -continue to trend Hbg transfuse <7 or if symptomatic   -maintain NWB to RLE  *Medically patient is at moderate risk for surgical intervention.*  -CT pelvis: acute right femoral transcervical fracture with varus angulation   - Pain control prn.   -PT/Rehab post surgical intervention     #HTN-c/w losartan 100mg  (converted from irbesartan); DASH diet  #Hypothyroidism-c/w Synthroid  #Breast Ca s/p Lumpectomy- Stable    #DVT ppx: SCDs for now  #GI ppx: not indicated  #Diet: DASH; NPO currently for OR  #Activity: Bedrest for now  #CHG  #FULL code  #Dispo: SNF

## 2019-10-24 NOTE — PROGRESS NOTE ADULT - SUBJECTIVE AND OBJECTIVE BOX
HPI  Patient is a 84y old Female who presents with a chief complaint of Fall (23 Oct 2019 16:07)    Currently admitted to medicine with the primary diagnosis of Closed fracture of right hip, initial encounter     Today is hospital day 4d.     INTERVAL HPI / OVERNIGHT EVENTS:  Patient was examined and seen at bedside. This morning she is resting comfortably in bed and reports no new issues or overnight events.     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 no greater than 0.2 mm and no more than 200 cells  Tonsil cancer  History of partial colectomy  Status post right breast lumpectomy  H/O abdominal hysterectomy  History of parathyroid surgery  Capsular cataract of both eyes    ALLERGIES  Biaxin (Unknown)  erythromycin (Unknown)  penicillin (Unknown)    MEDICATIONS  STANDING MEDICATIONS  calcium carbonate 1250 mG  + Vitamin D (OsCal 500 + D) 1 Tablet(s) Oral daily  chlorhexidine 2% Cloths 1 Application(s) Topical <User Schedule>  chlorhexidine 4% Liquid 1 Application(s) Topical <User Schedule>  ciprofloxacin   IVPB 400 milliGRAM(s) IV Intermittent every 12 hours  ferrous    sulfate 325 milliGRAM(s) Oral daily  heparin  Injectable 5000 Unit(s) SubCutaneous two times a day  lactated ringers. 1000 milliLiter(s) IV Continuous <Continuous>  levothyroxine 50 MICROGram(s) Oral daily  losartan 100 milliGRAM(s) Oral daily  multivitamin 1 Tablet(s) Oral daily  polyethylene glycol 3350 17 Gram(s) Oral daily    PRN MEDICATIONS  acetaminophen   Tablet .. 650 milliGRAM(s) Oral every 4 hours PRN  aluminum hydroxide/magnesium hydroxide/simethicone Suspension 30 milliLiter(s) Oral four times a day PRN  magnesium hydroxide Suspension 30 milliLiter(s) Oral daily PRN  morphine  - Injectable 2 milliGRAM(s) IV Push every 6 hours PRN  ondansetron Injectable 4 milliGRAM(s) IV Push every 6 hours PRN  oxycodone    5 mG/acetaminophen 325 mG 1 Tablet(s) Oral every 4 hours PRN  senna 2 Tablet(s) Oral at bedtime PRN    VITALS:  T(F): 96.6  HR: 98  BP: 112/58  RR: 18  SpO2: --    PHYSICAL EXAM  GEN: NAD, Resting comfortably in bed  PULM: Clear to auscultation bilaterally, No wheezes  CVS: Regular rate and rhythm, S1-S2, no murmurs  ABD: Soft, non-tender, non-distended, no guarding  EXT: No edema  NEURO: A&Ox3, no focal deficits    LABS                        10.1   15.32 )-----------( 233      ( 24 Oct 2019 04:41 )             30.2     10-    135  |  100  |  25<H>  ----------------------------<  139<H>  4.5   |  25  |  1.1    Ca    7.8<L>      24 Oct 2019 04:41  Phos  3.4     10-  Mg     2.3     10-24    TPro  5.7<L>  /  Alb  3.0<L>  /  TBili  0.3  /  DBili  x   /  AST  24  /  ALT  24  /  AlkPhos  96  10-24    PT/INR - ( 24 Oct 2019 01:07 )   PT: 14.70 sec;   INR: 1.28 ratio         PTT - ( 24 Oct 2019 01:07 )  PTT:27.6 sec  Urinalysis Basic - ( 23 Oct 2019 21:39 )    Color: Yellow / Appearance: Turbid / S.018 / pH: x  Gluc: x / Ketone: Negative  / Bili: Negative / Urobili: <2 mg/dL   Blood: x / Protein: Trace / Nitrite: Positive   Leuk Esterase: Large / RBC: 7 /HPF /  /HPF   Sq Epi: x / Non Sq Epi: 8 /HPF / Bacteria: Many                RADIOLOGY

## 2019-10-24 NOTE — PROGRESS NOTE ADULT - SUBJECTIVE AND OBJECTIVE BOX
85 y/o female s/p Right hip hemiarthroplasty 10/22/2019 POD#2.  Denies any SOB, palpitations, chest pain, dizziness or any other c/o. Reports pain (3/10) controlled by pain meds.    Vital Signs Last 24 Hrs  T(C): 35.9 (24 Oct 2019 07:40), Max: 38.3 (23 Oct 2019 17:26)  T(F): 96.6 (24 Oct 2019 07:40), Max: 100.9 (23 Oct 2019 17:26)  HR: 98 (24 Oct 2019 07:40) (92 - 113)  BP: 112/58 (24 Oct 2019 07:40) (112/55 - 145/65)  BP(mean): --  RR: 18 (24 Oct 2019 07:40) (18 - 18)  SpO2: --    GEN: WN/WD, A&O X 3, NAD;  Right hip and LE: Dressing clean and dry, decreased ROM secondary to pain, NVI, no decreased sensation, dorsi/plantar flexes ankle, no calf tenderness, DP pulse 2+.                        10.1   15.32 )-----------( 233      ( 24 Oct 2019 04:41 )             30.2   10-24    135  |  100  |  25<H>  ----------------------------<  139<H>  4.5   |  25  |  1.1    Ca    7.8<L>      24 Oct 2019 04:41  Phos  3.4     10-24  Mg     2.3     10-24    TPro  5.7<L>  /  Alb  3.0<L>  /  TBili  0.3  /  DBili  x   /  AST  24  /  ALT  24  /  AlkPhos  96  10-24    s/p Right hip hemiarthroplasty POD#2:  - continue current Tx and PT;  - DVT prophylaxis;  - pain management;  - WBAT, hip precautions; fall precautions;  - d/c plan - per primary team.

## 2019-10-24 NOTE — CHART NOTE - NSCHARTNOTEFT_GEN_A_CORE
Called by Ortho Attending ref patient who was found to be tachycardia, confused and Sat'ing 94-95% on RA.   Upon eval she was seen sitting out of bed w/ correa in place and c/o R hip pain - site of hemiarthroplasty. Upon using the incentive spirometer her sats went to 98% on RA.   V/S done at the time (21:00 ) T 100.2 /64 Sat 94 - 98 on RA , Resp 16 Pulse 110-115 - regular rhythm  Awake, alert, slightly confused/ disoriented to time c/o R hip pain   HEENT - stabismus, musocal surfaces dry ( pt c/o thirst)  Lungs - CTA - B/L   Heart - tachy , regular, no murmur   Abd - soft , NT , ND   Hip - dressing, cleaning and intact  Correa in place - cloudy urine    A/P   Fever/ Tachycardia/ confusion r/o sepsis r/o UTI, doubt PNA / PE given the sats increased on the incentive spirometer ( hypoventilation )  -UA / UCx/ CXR   -check Blood Cx and CBC at 11:30   Tachycardia - multifactorial - 2/2 fever/ pain / dehydration   -Tylenol given   -PRN Pain Rx  -IV bolus   -D/C correa  -EKG ordered     Case d/w Intern, Nurse and patient

## 2019-10-24 NOTE — PROGRESS NOTE ADULT - ASSESSMENT
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    1. Sepsis 2/2 UTI: On IV cipro. f/u urine cultures. If afebrile for 24 hrs, will de-escalate to PO and start DC planning  2. Fall c/b Right hip fracture: Pt is s/p R hemiarthroplasty. Cont current management. Pt is candidate for inpatient rehab.  3. HTN: Cont Cozaar 100mg PO qD  4. Hypothyroidism: Cont Synthroid    GI/DVT PPX    #Progress Note Handoff  Pending (specify): Observation for 24 hrs, DC planing if afebrile  Family discussion: d/w patient regarding tx for UTI and dispo  Disposition: 4A - inpatient rehab

## 2019-10-25 ENCOUNTER — INPATIENT (INPATIENT)
Facility: HOSPITAL | Age: 84
LOS: 13 days | Discharge: HOME HEALTH | End: 2019-11-08
Attending: PHYSICAL MEDICINE & REHABILITATION | Admitting: PHYSICAL MEDICINE & REHABILITATION
Payer: MEDICARE

## 2019-10-25 ENCOUNTER — TRANSCRIPTION ENCOUNTER (OUTPATIENT)
Age: 84
End: 2019-10-25

## 2019-10-25 VITALS
DIASTOLIC BLOOD PRESSURE: 60 MMHG | TEMPERATURE: 97 F | SYSTOLIC BLOOD PRESSURE: 127 MMHG | HEART RATE: 87 BPM | RESPIRATION RATE: 18 BRPM

## 2019-10-25 DIAGNOSIS — H26.9 UNSPECIFIED CATARACT: Chronic | ICD-10-CM

## 2019-10-25 DIAGNOSIS — Z98.890 OTHER SPECIFIED POSTPROCEDURAL STATES: Chronic | ICD-10-CM

## 2019-10-25 DIAGNOSIS — Z90.710 ACQUIRED ABSENCE OF BOTH CERVIX AND UTERUS: Chronic | ICD-10-CM

## 2019-10-25 DIAGNOSIS — Z90.49 ACQUIRED ABSENCE OF OTHER SPECIFIED PARTS OF DIGESTIVE TRACT: Chronic | ICD-10-CM

## 2019-10-25 PROBLEM — D47.2 MONOCLONAL GAMMOPATHY: Chronic | Status: ACTIVE | Noted: 2019-10-20

## 2019-10-25 PROBLEM — I10 ESSENTIAL (PRIMARY) HYPERTENSION: Chronic | Status: ACTIVE | Noted: 2019-10-20

## 2019-10-25 PROBLEM — C50.911 MALIGNANT NEOPLASM OF UNSPECIFIED SITE OF RIGHT FEMALE BREAST: Chronic | Status: ACTIVE | Noted: 2019-10-20

## 2019-10-25 PROBLEM — C09.9 MALIGNANT NEOPLASM OF TONSIL, UNSPECIFIED: Chronic | Status: ACTIVE | Noted: 2019-10-20

## 2019-10-25 LAB
ANION GAP SERPL CALC-SCNC: 10 MMOL/L — SIGNIFICANT CHANGE UP (ref 7–14)
BUN SERPL-MCNC: 17 MG/DL — SIGNIFICANT CHANGE UP (ref 10–20)
CALCIUM SERPL-MCNC: 7.9 MG/DL — LOW (ref 8.5–10.1)
CHLORIDE SERPL-SCNC: 100 MMOL/L — SIGNIFICANT CHANGE UP (ref 98–110)
CO2 SERPL-SCNC: 25 MMOL/L — SIGNIFICANT CHANGE UP (ref 17–32)
CREAT SERPL-MCNC: 0.9 MG/DL — SIGNIFICANT CHANGE UP (ref 0.7–1.5)
GLUCOSE SERPL-MCNC: 151 MG/DL — HIGH (ref 70–99)
HCT VFR BLD CALC: 26.5 % — LOW (ref 37–47)
HGB BLD-MCNC: 8.9 G/DL — LOW (ref 12–16)
MAGNESIUM SERPL-MCNC: 2 MG/DL — SIGNIFICANT CHANGE UP (ref 1.8–2.4)
MCHC RBC-ENTMCNC: 28.6 PG — SIGNIFICANT CHANGE UP (ref 27–31)
MCHC RBC-ENTMCNC: 33.6 G/DL — SIGNIFICANT CHANGE UP (ref 32–37)
MCV RBC AUTO: 85.2 FL — SIGNIFICANT CHANGE UP (ref 81–99)
NRBC # BLD: 0 /100 WBCS — SIGNIFICANT CHANGE UP (ref 0–0)
PHOSPHATE SERPL-MCNC: 3.1 MG/DL — SIGNIFICANT CHANGE UP (ref 2.1–4.9)
PLATELET # BLD AUTO: 213 K/UL — SIGNIFICANT CHANGE UP (ref 130–400)
POTASSIUM SERPL-MCNC: 4.3 MMOL/L — SIGNIFICANT CHANGE UP (ref 3.5–5)
POTASSIUM SERPL-SCNC: 4.3 MMOL/L — SIGNIFICANT CHANGE UP (ref 3.5–5)
RBC # BLD: 3.11 M/UL — LOW (ref 4.2–5.4)
RBC # FLD: 13.9 % — SIGNIFICANT CHANGE UP (ref 11.5–14.5)
SODIUM SERPL-SCNC: 135 MMOL/L — SIGNIFICANT CHANGE UP (ref 135–146)
WBC # BLD: 13.06 K/UL — HIGH (ref 4.8–10.8)
WBC # FLD AUTO: 13.06 K/UL — HIGH (ref 4.8–10.8)

## 2019-10-25 PROCEDURE — 93010 ELECTROCARDIOGRAM REPORT: CPT

## 2019-10-25 PROCEDURE — 93010 ELECTROCARDIOGRAM REPORT: CPT | Mod: 77

## 2019-10-25 PROCEDURE — 99238 HOSP IP/OBS DSCHRG MGMT 30/<: CPT

## 2019-10-25 RX ORDER — ACETAMINOPHEN 500 MG
2 TABLET ORAL
Qty: 0 | Refills: 0 | DISCHARGE
Start: 2019-10-25

## 2019-10-25 RX ORDER — SENNA PLUS 8.6 MG/1
2 TABLET ORAL AT BEDTIME
Refills: 0 | Status: DISCONTINUED | OUTPATIENT
Start: 2019-10-26 | End: 2019-10-29

## 2019-10-25 RX ORDER — CIPROFLOXACIN LACTATE 400MG/40ML
500 VIAL (ML) INTRAVENOUS
Refills: 0 | Status: COMPLETED | OUTPATIENT
Start: 2019-10-25 | End: 2019-10-31

## 2019-10-25 RX ORDER — FERROUS SULFATE 325(65) MG
325 TABLET ORAL DAILY
Refills: 0 | Status: DISCONTINUED | OUTPATIENT
Start: 2019-10-25 | End: 2019-10-28

## 2019-10-25 RX ORDER — PREGABALIN 225 MG/1
100 CAPSULE ORAL DAILY
Refills: 0 | Status: DISCONTINUED | OUTPATIENT
Start: 2019-10-25 | End: 2019-10-28

## 2019-10-25 RX ORDER — LOSARTAN POTASSIUM 100 MG/1
100 TABLET, FILM COATED ORAL DAILY
Refills: 0 | Status: DISCONTINUED | OUTPATIENT
Start: 2019-10-26 | End: 2019-10-29

## 2019-10-25 RX ORDER — LANOLIN ALCOHOL/MO/W.PET/CERES
5 CREAM (GRAM) TOPICAL AT BEDTIME
Refills: 0 | Status: DISCONTINUED | OUTPATIENT
Start: 2019-10-25 | End: 2019-11-08

## 2019-10-25 RX ORDER — LEVOTHYROXINE SODIUM 125 MCG
50 TABLET ORAL DAILY
Refills: 0 | Status: DISCONTINUED | OUTPATIENT
Start: 2019-10-26 | End: 2019-11-08

## 2019-10-25 RX ORDER — SENNA PLUS 8.6 MG/1
2 TABLET ORAL AT BEDTIME
Refills: 0 | Status: DISCONTINUED | OUTPATIENT
Start: 2019-10-25 | End: 2019-10-25

## 2019-10-25 RX ORDER — ACETAMINOPHEN 500 MG
650 TABLET ORAL EVERY 4 HOURS
Refills: 0 | Status: DISCONTINUED | OUTPATIENT
Start: 2019-10-25 | End: 2019-11-08

## 2019-10-25 RX ORDER — MAGNESIUM HYDROXIDE 400 MG/1
30 TABLET, CHEWABLE ORAL DAILY
Refills: 0 | Status: DISCONTINUED | OUTPATIENT
Start: 2019-10-25 | End: 2019-11-08

## 2019-10-25 RX ORDER — FERROUS SULFATE 325(65) MG
325 TABLET ORAL
Refills: 0 | Status: DISCONTINUED | OUTPATIENT
Start: 2019-10-25 | End: 2019-10-25

## 2019-10-25 RX ORDER — LACTULOSE 10 G/15ML
10 SOLUTION ORAL ONCE
Refills: 0 | Status: COMPLETED | OUTPATIENT
Start: 2019-10-25 | End: 2019-10-25

## 2019-10-25 RX ORDER — POLYETHYLENE GLYCOL 3350 17 G/17G
17 POWDER, FOR SOLUTION ORAL
Refills: 0 | Status: DISCONTINUED | OUTPATIENT
Start: 2019-10-25 | End: 2019-10-29

## 2019-10-25 RX ORDER — PANTOPRAZOLE SODIUM 20 MG/1
40 TABLET, DELAYED RELEASE ORAL
Refills: 0 | Status: DISCONTINUED | OUTPATIENT
Start: 2019-10-25 | End: 2019-11-08

## 2019-10-25 RX ORDER — OXYCODONE AND ACETAMINOPHEN 5; 325 MG/1; MG/1
1 TABLET ORAL EVERY 4 HOURS
Refills: 0 | Status: DISCONTINUED | OUTPATIENT
Start: 2019-10-25 | End: 2019-10-29

## 2019-10-25 RX ORDER — CIPROFLOXACIN LACTATE 400MG/40ML
1 VIAL (ML) INTRAVENOUS
Qty: 12 | Refills: 0
Start: 2019-10-25 | End: 2019-10-30

## 2019-10-25 RX ADMIN — OXYCODONE AND ACETAMINOPHEN 1 TABLET(S): 5; 325 TABLET ORAL at 23:34

## 2019-10-25 RX ADMIN — Medication 1 TABLET(S): at 11:34

## 2019-10-25 RX ADMIN — POLYETHYLENE GLYCOL 3350 17 GRAM(S): 17 POWDER, FOR SOLUTION ORAL at 11:34

## 2019-10-25 RX ADMIN — OXYCODONE AND ACETAMINOPHEN 1 TABLET(S): 5; 325 TABLET ORAL at 10:44

## 2019-10-25 RX ADMIN — LOSARTAN POTASSIUM 100 MILLIGRAM(S): 100 TABLET, FILM COATED ORAL at 05:26

## 2019-10-25 RX ADMIN — OXYCODONE AND ACETAMINOPHEN 1 TABLET(S): 5; 325 TABLET ORAL at 11:14

## 2019-10-25 RX ADMIN — PANTOPRAZOLE SODIUM 40 MILLIGRAM(S): 20 TABLET, DELAYED RELEASE ORAL at 05:30

## 2019-10-25 RX ADMIN — Medication 500 MILLIGRAM(S): at 21:00

## 2019-10-25 RX ADMIN — LACTULOSE 10 GRAM(S): 10 SOLUTION ORAL at 11:56

## 2019-10-25 RX ADMIN — OXYCODONE AND ACETAMINOPHEN 1 TABLET(S): 5; 325 TABLET ORAL at 05:27

## 2019-10-25 RX ADMIN — Medication 200 MILLIGRAM(S): at 05:25

## 2019-10-25 RX ADMIN — OXYCODONE AND ACETAMINOPHEN 1 TABLET(S): 5; 325 TABLET ORAL at 20:18

## 2019-10-25 RX ADMIN — Medication 325 MILLIGRAM(S): at 11:34

## 2019-10-25 RX ADMIN — Medication 1 DROP(S): at 20:21

## 2019-10-25 RX ADMIN — HEPARIN SODIUM 5000 UNIT(S): 5000 INJECTION INTRAVENOUS; SUBCUTANEOUS at 05:26

## 2019-10-25 RX ADMIN — Medication 50 MICROGRAM(S): at 05:26

## 2019-10-25 NOTE — DISCHARGE NOTE PROVIDER - CARE PROVIDERS DIRECT ADDRESSES
,[email or web address],DirectAddress_Unknown

## 2019-10-25 NOTE — PROGRESS NOTE ADULT - ATTENDING COMMENTS
Indicated for total/ vs devante arthroplasty  Discussed risks/ benefits/ alternatives of the procedure with patient/ family  Informed consent obtained  Will proceed to OR
Pt. seen/ examined  Pain well-controlled  Persistently tachycardic  rechecked manually () and w/ portable pulse oxymeter (-118, O2 Sat 94-95)  Contacted medicine team, Dr. Sorto  Strongly recommend R/O PE  Pt. is not currently SOB  Will follow
Pt. seen/ examined  labs/ vitals reviewed  Discussed follow up  Planning D/C to 4A rehab on antibiotic coverage for UTI

## 2019-10-25 NOTE — CHART NOTE - NSCHARTNOTEFT_GEN_A_CORE
<<<RESIDENT DISCHARGE NOTE>>>     YEIMY FUENTES  MRN-[id number]    VITAL SIGNS:  T(F): 96.7 (10-25-19 @ 08:33), Max: 99.5 (10-24-19 @ 16:01)  HR: 87 (10-25-19 @ 08:33)  BP: 127/60 (10-25-19 @ 08:33)  SpO2: --      PHYSICAL EXAMINATION:  GEN: NAD, Resting comfortably in bed  PULM: Clear to auscultation bilaterally, No wheezes  CVS: Regular rate and rhythm, S1-S2, no murmurs  ABD: Soft, non-tender, non-distended, no guarding  EXT: No edema  NEURO: AAOx3, no focal deficits    TEST RESULTS:                        8.9    13.06 )-----------( 213      ( 25 Oct 2019 06:11 )             26.5       10-25    135  |  100  |  17  ----------------------------<  151<H>  4.3   |  25  |  0.9    Ca    7.9<L>      25 Oct 2019 06:11  Phos  3.1     10-25  Mg     2.0     10-25    TPro  5.7<L>  /  Alb  3.0<L>  /  TBili  0.3  /  DBili  x   /  AST  24  /  ALT  24  /  AlkPhos  96  10-24      FINAL DISCHARGE INTERVIEW:  Resident(s) Present: (Name: Dr. Olmstead)    DISCHARGE MEDICATION RECONCILIATION  reviewed with Attending (Name: Dr. Menjivar )    DISPOSITION:   [  ] Home,    [  ] Home with Visiting Nursing Services,   [  ]  SNF/ NH,    [X ] Acute Rehab (4A),   [  ] Other (Specify:_________)

## 2019-10-25 NOTE — DISCHARGE NOTE PROVIDER - HOSPITAL COURSE
83 yo female pmh of HTN, hypothyroidism, GERD, Tonsillar Ca due to HPV s/p tonsillectomy and base of tongue resection, Breast Ca s/p Rt Lumpectomy, s/p BSO and JACKELYN, s/p parathyroid surgery, laparoscopic partial colectomy (Colovesical fistula) p/w Right hip pain and inability to ambulate s/p mechanical fall. History dates back to this morning when pt was vacuuming, tripped over the cord  and fell onto her right side. Pt had immediate pain afterwards with inability to ambulate. Pt was on the ground for about 30 min and slid herself to get to the phone. Pt did not hit her head. Pt did not feel dizzy prior or post fall. No generalized shaking body movements. Pt denied LOC, headaches, blurry vision, chest pain, palpitations, SOB, Nausea/vomiting, fever, rash or abdominal pain. Pt denied numbness, tingling or weakness of b/l LE.     ER course: Vitals were /73, HR 99, T 98F, SpO2 99% on RA. XRay hip showed right femur fracture with orthopedics to take patient for surgical intervention tomorrow possibly.        During hospital course, patient underwent R. Hemiarthroplasty on 10/22. Her course was complicated by UTI treated with IV antibiotics. Patient is currently stable for discharge.         Discharge instructions discussed and patient knows when to seek immediate medical attention. Patient has proper follow up. All results discussed and patient aware they may require further follow up. Stressed importance of proper follow up. Medications prescribed and changes discussed. All questions and concerns from patient and family addressed. Understanding of instructions verbalized.

## 2019-10-25 NOTE — DISCHARGE NOTE NURSING/CASE MANAGEMENT/SOCIAL WORK - PATIENT PORTAL LINK FT
You can access the FollowMyHealth Patient Portal offered by [facility] by registering at the following website: [email or web address]. By joining [employer]’s FollowMyHealth portal, you will also be able to view your health information using other applications (apps) compatible with our system.

## 2019-10-25 NOTE — PROGRESS NOTE ADULT - ASSESSMENT
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    1. Sepsis 2/2 UTI: On IV cipro. UCx + for GNR. Pt was tachycardic overnight to 100s, but improved today morning. Afebrile for 24 hrs. DC planning to rehab with 7 days of cipro PO 500mg BID.  2. Fall c/b Right hip fracture: Pt is s/p R hemiarthroplasty. Cont current management. Pt is candidate for inpatient rehab.  3. HTN: Cont Cozaar 100mg PO qD  4. Hypothyroidism: Cont Synthroid    GI/DVT PPX    #Progress Note Handoff  Pending (specify): DC planning  Family discussion: d/w patient regarding tx for UTI and dispo  Disposition: 4A - inpatient rehab

## 2019-10-25 NOTE — DISCHARGE NOTE PROVIDER - CARE PROVIDER_API CALL
Danial Avila)  Orthopaedic Surgery Surgery  [address] [place] [address]  Phone: [phone]  Fax: [phone]  Follow Up Time: 1 week    Caeasr Fournier  Phone: (   )    -  Fax: (   )    -  Follow Up Time: 1 week

## 2019-10-25 NOTE — DISCHARGE NOTE PROVIDER - PROVIDER TOKENS
PROVIDER:[TOKEN:[[id number]],FOLLOWUP:[1 week]],FREE:[LAST:[Shantanu],FIRST:[Caesar],PHONE:[(   )    -],FAX:[(   )    -],FOLLOWUP:[1 week]]

## 2019-10-25 NOTE — PROGRESS NOTE ADULT - SUBJECTIVE AND OBJECTIVE BOX
YEIMY FUENTES  84y, Female  Allergy: Biaxin (Unknown)  erythromycin (Unknown)  penicillin (Unknown)    Hospital Day: 5d    Patient seen and examined earlier today. Patient endorses doing well today morning.    PMH/PSH:  PAST MEDICAL & SURGICAL HISTORY:  Monoclonal gammopathy  HTN (hypertension)  Right breast cancer with malignant cells in regional lymph nodes no greater than 0.2 mm and no more than 200 cells  Tonsil cancer  History of partial colectomy  Status post right breast lumpectomy  H/O abdominal hysterectomy  History of parathyroid surgery  Capsular cataract of both eyes    VITALS:  T(F): 96.7 (10-25-19 @ 08:33), Max: 99.5 (10-24-19 @ 16:01)  HR: 87 (10-25-19 @ 08:33)  BP: 127/60 (10-25-19 @ 08:33) (126/61 - 129/61)  RR: 18 (10-25-19 @ 08:33)  SpO2: --    TESTS & MEASUREMENTS:  Weight (Kg):   BMI (kg/m2): 21.6 (10-21)    10-23-19 @ 07:01  -  10-24-19 @ 07:00  --------------------------------------------------------  IN: 960 mL / OUT: 1550 mL / NET: -590 mL    10-24-19 @ 07:01  -  10-25-19 @ 07:00  --------------------------------------------------------  IN: 960 mL / OUT: 300 mL / NET: 660 mL                            8.9    13.06 )-----------( 213      ( 25 Oct 2019 06:11 )             26.5     PT/INR - ( 24 Oct 2019 01:07 )   PT: 14.70 sec;   INR: 1.28 ratio         PTT - ( 24 Oct 2019 01:07 )  PTT:27.6 sec  10-25    135  |  100  |  17  ----------------------------<  151<H>  4.3   |  25  |  0.9    Ca    7.9<L>      25 Oct 2019 06:11  Phos  3.1     10  Mg     2.0     10-25    TPro  5.7<L>  /  Alb  3.0<L>  /  TBili  0.3  /  DBili  x   /  AST  24  /  ALT  24  /  AlkPhos  96  10-24    LIVER FUNCTIONS - ( 24 Oct 2019 01:07 )  Alb: 3.0 g/dL / Pro: 5.7 g/dL / ALK PHOS: 96 U/L / ALT: 24 U/L / AST: 24 U/L / GGT: x                 Culture - Blood (collected 10-24-19 @ 01:48)  Source: .Blood Blood  Preliminary Report (10-25-19 @ 06:00):    No growth to date.    Culture - Urine (collected 10-23-19 @ 21:39)  Source: .Urine Catheterized  Preliminary Report (10-25-19 @ 07:47):    >100,000 CFU/ml Gram Negative Rods      Urinalysis Basic - ( 23 Oct 2019 21:39 )    Color: Yellow / Appearance: Turbid / S.018 / pH: x  Gluc: x / Ketone: Negative  / Bili: Negative / Urobili: <2 mg/dL   Blood: x / Protein: Trace / Nitrite: Positive   Leuk Esterase: Large / RBC: 7 /HPF /  /HPF   Sq Epi: x / Non Sq Epi: 8 /HPF / Bacteria: Many    MEDICATIONS:  MEDICATIONS  (STANDING):  calcium carbonate 1250 mG  + Vitamin D (OsCal 500 + D) 1 Tablet(s) Oral daily  chlorhexidine 2% Cloths 1 Application(s) Topical <User Schedule>  chlorhexidine 4% Liquid 1 Application(s) Topical <User Schedule>  ciprofloxacin   IVPB 400 milliGRAM(s) IV Intermittent every 12 hours  ferrous    sulfate 325 milliGRAM(s) Oral daily  heparin  Injectable 5000 Unit(s) SubCutaneous two times a day  lactated ringers. 1000 milliLiter(s) (50 mL/Hr) IV Continuous <Continuous>  lactulose Syrup 10 Gram(s) Oral once  levothyroxine 50 MICROGram(s) Oral daily  losartan 100 milliGRAM(s) Oral daily  multivitamin 1 Tablet(s) Oral daily  pantoprazole    Tablet 40 milliGRAM(s) Oral before breakfast  polyethylene glycol 3350 17 Gram(s) Oral daily    MEDICATIONS  (PRN):  acetaminophen   Tablet .. 650 milliGRAM(s) Oral every 4 hours PRN Mild Pain (1 - 3)  aluminum hydroxide/magnesium hydroxide/simethicone Suspension 30 milliLiter(s) Oral four times a day PRN Indigestion  bisacodyl Suppository 10 milliGRAM(s) Rectal daily PRN If no bowel movement by POD#2  magnesium hydroxide Suspension 30 milliLiter(s) Oral daily PRN Constipation  morphine  - Injectable 2 milliGRAM(s) IV Push every 6 hours PRN Severe Pain (7 - 10)  ondansetron Injectable 4 milliGRAM(s) IV Push every 6 hours PRN Nausea and/or Vomiting  oxycodone    5 mG/acetaminophen 325 mG 1 Tablet(s) Oral every 4 hours PRN Moderate Pain (4 - 6)  senna 2 Tablet(s) Oral at bedtime PRN Constipation      HOME MEDICATIONS:  acetaminophen 325 mg oral tablet (10-25)  Colace 60 mg/15 mL oral syrup (10-20)  D3 with Calcium 25 mcg (1000 intl units)-90 mg oral tablet (10-20)  ferrous sulfate 324 mg (65 mg elemental iron) oral tablet (10-20)  irbesartan 300 mg oral tablet (10-20)  Multiple Vitamins oral capsule (10-20)  oxycodone-acetaminophen 5 mg-325 mg oral tablet (10-25)  Synthroid 50 mcg (0.05 mg) oral tablet (10-20)  Systane ophthalmic solution (10-20)  Vitamin B12 100 mcg oral tablet (10-20)      REVIEW OF SYSTEMS:  All other review of systems is negative unless indicated above.     PHYSICAL EXAM:  GENERAL: NAD  HEENT: No Swelling  CHEST/LUNG: Good air entry, No wheezing  HEART: RRR, No murmurs  ABDOMEN: Soft, Bowel sounds present  EXTREMITIES:  No clubbing

## 2019-10-25 NOTE — PROGRESS NOTE ADULT - ASSESSMENT
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  Pending dispo to 4a.         #Sepsis 2/2 UTI-sepsis resolved   -UA +nitrites, leuk emily, wbc  -urine culture positive for GNR, awaiting specificty, blood culture NGTD  -c/w IV abx    #Acute right femoral neck transcervical fracture s/p mechanical fall  -Xray showed Acute right femoral neck transcervical fracture in varus angulation with severe right hip degenerative change and joint space narrowing. Moderate left hip osteoarthritis present.  -Ortho: Surgical fixation of right hip fracture with hemiarthroplasty vs total hip arthroplasty-  -s/p hemiarthroplasty EBL 400cc  -continue to trend Hbg transfuse <7 or if symptomatic   -maintain NWB to RLE  *Medically patient is at moderate risk for surgical intervention.*  -CT pelvis: acute right femoral transcervical fracture with varus angulation   - Pain control prn.   -PT/Rehab post surgical intervention     #HTN-c/w losartan 100mg  (converted from irbesartan); DASH diet  #Hypothyroidism-c/w Synthroid  #Breast Ca s/p Lumpectomy- Stable    #DVT ppx: SCDs for now  #GI ppx: not indicated  #Diet: DASH; NPO currently for OR  #Activity: Bedrest for now  #CHG  #FULL code  #Dispo: 4a

## 2019-10-25 NOTE — PROGRESS NOTE ADULT - SUBJECTIVE AND OBJECTIVE BOX
HPI  Patient is a 84y old Female who presents with a chief complaint of Fall (25 Oct 2019 10:58)    Currently admitted to medicine with the primary diagnosis of Closed fracture of right hip, initial encounter     Today is hospital day 5d.     INTERVAL HPI / OVERNIGHT EVENTS:  Patient was examined and seen at bedside. This morning she is resting comfortably in bed and reports no new issues or overnight events.     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 no greater than 0.2 mm and no more than 200 cells  Tonsil cancer  History of partial colectomy  Status post right breast lumpectomy  H/O abdominal hysterectomy  History of parathyroid surgery  Capsular cataract of both eyes    ALLERGIES  Biaxin (Unknown)  erythromycin (Unknown)  penicillin (Unknown)    MEDICATIONS  STANDING MEDICATIONS  calcium carbonate 1250 mG  + Vitamin D (OsCal 500 + D) 1 Tablet(s) Oral daily  chlorhexidine 2% Cloths 1 Application(s) Topical <User Schedule>  chlorhexidine 4% Liquid 1 Application(s) Topical <User Schedule>  ciprofloxacin   IVPB 400 milliGRAM(s) IV Intermittent every 12 hours  ferrous    sulfate 325 milliGRAM(s) Oral daily  heparin  Injectable 5000 Unit(s) SubCutaneous two times a day  lactated ringers. 1000 milliLiter(s) IV Continuous <Continuous>  levothyroxine 50 MICROGram(s) Oral daily  losartan 100 milliGRAM(s) Oral daily  multivitamin 1 Tablet(s) Oral daily  pantoprazole    Tablet 40 milliGRAM(s) Oral before breakfast  polyethylene glycol 3350 17 Gram(s) Oral daily    PRN MEDICATIONS  acetaminophen   Tablet .. 650 milliGRAM(s) Oral every 4 hours PRN  aluminum hydroxide/magnesium hydroxide/simethicone Suspension 30 milliLiter(s) Oral four times a day PRN  bisacodyl Suppository 10 milliGRAM(s) Rectal daily PRN  magnesium hydroxide Suspension 30 milliLiter(s) Oral daily PRN  morphine  - Injectable 2 milliGRAM(s) IV Push every 6 hours PRN  ondansetron Injectable 4 milliGRAM(s) IV Push every 6 hours PRN  oxycodone    5 mG/acetaminophen 325 mG 1 Tablet(s) Oral every 4 hours PRN  senna 2 Tablet(s) Oral at bedtime PRN    VITALS:  T(F): 96.7  HR: 87  BP: 127/60  RR: 18  SpO2: --    PHYSICAL EXAM  GEN: NAD, Resting comfortably in bed  PULM: Clear to auscultation bilaterally, No wheezes  CVS: Regular rate and rhythm, S1-S2, no murmurs  ABD: Soft, non-tender, non-distended, no guarding  EXT: No edema  NEURO: A&Ox3, no focal deficits    LABS                        8.9    13.06 )-----------( 213      ( 25 Oct 2019 06:11 )             26.5     10-25    135  |  100  |  17  ----------------------------<  151<H>  4.3   |  25  |  0.9    Ca    7.9<L>      25 Oct 2019 06:11  Phos  3.1     10-  Mg     2.0     10-    TPro  5.7<L>  /  Alb  3.0<L>  /  TBili  0.3  /  DBili  x   /  AST  24  /  ALT  24  /  AlkPhos  96  10-24    PT/INR - ( 24 Oct 2019 01:07 )   PT: 14.70 sec;   INR: 1.28 ratio         PTT - ( 24 Oct 2019 01:07 )  PTT:27.6 sec  Urinalysis Basic - ( 23 Oct 2019 21:39 )    Color: Yellow / Appearance: Turbid / S.018 / pH: x  Gluc: x / Ketone: Negative  / Bili: Negative / Urobili: <2 mg/dL   Blood: x / Protein: Trace / Nitrite: Positive   Leuk Esterase: Large / RBC: 7 /HPF /  /HPF   Sq Epi: x / Non Sq Epi: 8 /HPF / Bacteria: Many            Culture - Blood (collected 24 Oct 2019 01:48)  Source: .Blood Blood  Preliminary Report (25 Oct 2019 06:00):    No growth to date.    Culture - Urine (collected 23 Oct 2019 21:39)  Source: .Urine Catheterized  Preliminary Report (25 Oct 2019 07:47):    >100,000 CFU/ml Gram Negative Rods      RADIOLOGY

## 2019-10-25 NOTE — PROGRESS NOTE ADULT - SUBJECTIVE AND OBJECTIVE BOX
ORTHO PROGRESS    S/E at bedside this AM. POD#3 S/P R hip hemiarthroplasty. Pain is controlled, denies numbness or tingling. Denies fevers or chills, chest or calf pain. HR improved. UTI on Abx.                           8.9    13.06 )-----------( 213      ( 25 Oct 2019 06:11 )             26.5     ICU Vital Signs Last 24 Hrs  T(C): 35.9 (25 Oct 2019 08:33), Max: 37.5 (24 Oct 2019 16:01)  T(F): 96.7 (25 Oct 2019 08:33), Max: 99.5 (24 Oct 2019 16:01)  HR: 87 (25 Oct 2019 08:33) (87 - 109)  BP: 127/60 (25 Oct 2019 08:33) (126/61 - 129/61)  BP(mean): --  ABP: --  ABP(mean): --  RR: 18 (25 Oct 2019 08:33) (18 - 18)  SpO2: --      PE:    RLE  Dressing c/d/i  Compartments soft and compressible  SILT distally  Motor intact EHL/FHL/TA  WWP    A/P: 79F S/P R hip hemiarthroplasty for R Hip fracture, POD#3:  - Trend H/H transfuse PRN.   - OOBC, PT, WBAT  - DVT ppx  - Pain control  - Hip percautions  - No orthopaedic contraindication to dispo per PT.

## 2019-10-26 LAB
ALBUMIN SERPL ELPH-MCNC: 3.3 G/DL — LOW (ref 3.5–5.2)
ALP SERPL-CCNC: 97 U/L — SIGNIFICANT CHANGE UP (ref 30–115)
ALT FLD-CCNC: 21 U/L — SIGNIFICANT CHANGE UP (ref 0–41)
ANION GAP SERPL CALC-SCNC: 12 MMOL/L — SIGNIFICANT CHANGE UP (ref 7–14)
AST SERPL-CCNC: 22 U/L — SIGNIFICANT CHANGE UP (ref 0–41)
BASOPHILS # BLD AUTO: 0.04 K/UL — SIGNIFICANT CHANGE UP (ref 0–0.2)
BASOPHILS NFR BLD AUTO: 0.3 % — SIGNIFICANT CHANGE UP (ref 0–1)
BILIRUB SERPL-MCNC: 0.6 MG/DL — SIGNIFICANT CHANGE UP (ref 0.2–1.2)
BUN SERPL-MCNC: 15 MG/DL — SIGNIFICANT CHANGE UP (ref 10–20)
CALCIUM SERPL-MCNC: 8.3 MG/DL — LOW (ref 8.5–10.1)
CHLORIDE SERPL-SCNC: 99 MMOL/L — SIGNIFICANT CHANGE UP (ref 98–110)
CO2 SERPL-SCNC: 27 MMOL/L — SIGNIFICANT CHANGE UP (ref 17–32)
CREAT SERPL-MCNC: 0.9 MG/DL — SIGNIFICANT CHANGE UP (ref 0.7–1.5)
EOSINOPHIL # BLD AUTO: 0.27 K/UL — SIGNIFICANT CHANGE UP (ref 0–0.7)
EOSINOPHIL NFR BLD AUTO: 2.1 % — SIGNIFICANT CHANGE UP (ref 0–8)
GLUCOSE SERPL-MCNC: 118 MG/DL — HIGH (ref 70–99)
HCT VFR BLD CALC: 29.7 % — LOW (ref 37–47)
HGB BLD-MCNC: 9.6 G/DL — LOW (ref 12–16)
IMM GRANULOCYTES NFR BLD AUTO: 0.7 % — HIGH (ref 0.1–0.3)
LYMPHOCYTES # BLD AUTO: 1.66 K/UL — SIGNIFICANT CHANGE UP (ref 1.2–3.4)
LYMPHOCYTES # BLD AUTO: 12.8 % — LOW (ref 20.5–51.1)
MAGNESIUM SERPL-MCNC: 2 MG/DL — SIGNIFICANT CHANGE UP (ref 1.8–2.4)
MCHC RBC-ENTMCNC: 27.9 PG — SIGNIFICANT CHANGE UP (ref 27–31)
MCHC RBC-ENTMCNC: 32.3 G/DL — SIGNIFICANT CHANGE UP (ref 32–37)
MCV RBC AUTO: 86.3 FL — SIGNIFICANT CHANGE UP (ref 81–99)
MONOCYTES # BLD AUTO: 1.39 K/UL — HIGH (ref 0.1–0.6)
MONOCYTES NFR BLD AUTO: 10.7 % — HIGH (ref 1.7–9.3)
NEUTROPHILS # BLD AUTO: 9.51 K/UL — HIGH (ref 1.4–6.5)
NEUTROPHILS NFR BLD AUTO: 73.4 % — SIGNIFICANT CHANGE UP (ref 42.2–75.2)
NRBC # BLD: 0 /100 WBCS — SIGNIFICANT CHANGE UP (ref 0–0)
PLATELET # BLD AUTO: 272 K/UL — SIGNIFICANT CHANGE UP (ref 130–400)
POTASSIUM SERPL-MCNC: 4.7 MMOL/L — SIGNIFICANT CHANGE UP (ref 3.5–5)
POTASSIUM SERPL-SCNC: 4.7 MMOL/L — SIGNIFICANT CHANGE UP (ref 3.5–5)
PROT SERPL-MCNC: 6.2 G/DL — SIGNIFICANT CHANGE UP (ref 6–8)
RBC # BLD: 3.44 M/UL — LOW (ref 4.2–5.4)
RBC # FLD: 13.9 % — SIGNIFICANT CHANGE UP (ref 11.5–14.5)
SODIUM SERPL-SCNC: 138 MMOL/L — SIGNIFICANT CHANGE UP (ref 135–146)
WBC # BLD: 12.96 K/UL — HIGH (ref 4.8–10.8)
WBC # FLD AUTO: 12.96 K/UL — HIGH (ref 4.8–10.8)

## 2019-10-26 RX ADMIN — Medication 650 MILLIGRAM(S): at 11:58

## 2019-10-26 RX ADMIN — Medication 50 MICROGRAM(S): at 05:33

## 2019-10-26 RX ADMIN — SENNA PLUS 2 TABLET(S): 8.6 TABLET ORAL at 21:15

## 2019-10-26 RX ADMIN — OXYCODONE AND ACETAMINOPHEN 1 TABLET(S): 5; 325 TABLET ORAL at 07:52

## 2019-10-26 RX ADMIN — Medication 1 TABLET(S): at 11:58

## 2019-10-26 RX ADMIN — OXYCODONE AND ACETAMINOPHEN 1 TABLET(S): 5; 325 TABLET ORAL at 12:05

## 2019-10-26 RX ADMIN — Medication 650 MILLIGRAM(S): at 09:43

## 2019-10-26 RX ADMIN — POLYETHYLENE GLYCOL 3350 17 GRAM(S): 17 POWDER, FOR SOLUTION ORAL at 21:15

## 2019-10-26 RX ADMIN — Medication 1 DROP(S): at 05:34

## 2019-10-26 RX ADMIN — Medication 500 MILLIGRAM(S): at 05:33

## 2019-10-26 RX ADMIN — Medication 325 MILLIGRAM(S): at 11:58

## 2019-10-26 RX ADMIN — OXYCODONE AND ACETAMINOPHEN 1 TABLET(S): 5; 325 TABLET ORAL at 17:30

## 2019-10-26 RX ADMIN — PANTOPRAZOLE SODIUM 40 MILLIGRAM(S): 20 TABLET, DELAYED RELEASE ORAL at 06:08

## 2019-10-26 RX ADMIN — LOSARTAN POTASSIUM 100 MILLIGRAM(S): 100 TABLET, FILM COATED ORAL at 05:33

## 2019-10-26 RX ADMIN — Medication 500 MILLIGRAM(S): at 17:19

## 2019-10-26 RX ADMIN — OXYCODONE AND ACETAMINOPHEN 1 TABLET(S): 5; 325 TABLET ORAL at 13:20

## 2019-10-26 RX ADMIN — OXYCODONE AND ACETAMINOPHEN 1 TABLET(S): 5; 325 TABLET ORAL at 20:04

## 2019-10-26 RX ADMIN — OXYCODONE AND ACETAMINOPHEN 1 TABLET(S): 5; 325 TABLET ORAL at 16:03

## 2019-10-27 LAB
-  AMIKACIN: SIGNIFICANT CHANGE UP
-  AMPICILLIN/SULBACTAM: SIGNIFICANT CHANGE UP
-  AMPICILLIN: SIGNIFICANT CHANGE UP
-  AZTREONAM: SIGNIFICANT CHANGE UP
-  CEFAZOLIN: SIGNIFICANT CHANGE UP
-  CEFEPIME: SIGNIFICANT CHANGE UP
-  CEFOXITIN: SIGNIFICANT CHANGE UP
-  CEFTRIAXONE: SIGNIFICANT CHANGE UP
-  CIPROFLOXACIN: SIGNIFICANT CHANGE UP
-  GENTAMICIN: SIGNIFICANT CHANGE UP
-  IMIPENEM: SIGNIFICANT CHANGE UP
-  LEVOFLOXACIN: SIGNIFICANT CHANGE UP
-  MEROPENEM: SIGNIFICANT CHANGE UP
-  NITROFURANTOIN: SIGNIFICANT CHANGE UP
-  PIPERACILLIN/TAZOBACTAM: SIGNIFICANT CHANGE UP
-  TIGECYCLINE: SIGNIFICANT CHANGE UP
-  TOBRAMYCIN: SIGNIFICANT CHANGE UP
-  TRIMETHOPRIM/SULFAMETHOXAZOLE: SIGNIFICANT CHANGE UP
METHOD TYPE: SIGNIFICANT CHANGE UP

## 2019-10-27 RX ADMIN — Medication 1 TABLET(S): at 11:28

## 2019-10-27 RX ADMIN — PANTOPRAZOLE SODIUM 40 MILLIGRAM(S): 20 TABLET, DELAYED RELEASE ORAL at 06:03

## 2019-10-27 RX ADMIN — Medication 650 MILLIGRAM(S): at 21:27

## 2019-10-27 RX ADMIN — LOSARTAN POTASSIUM 100 MILLIGRAM(S): 100 TABLET, FILM COATED ORAL at 05:36

## 2019-10-27 RX ADMIN — OXYCODONE AND ACETAMINOPHEN 1 TABLET(S): 5; 325 TABLET ORAL at 21:23

## 2019-10-27 RX ADMIN — OXYCODONE AND ACETAMINOPHEN 1 TABLET(S): 5; 325 TABLET ORAL at 11:28

## 2019-10-27 RX ADMIN — Medication 500 MILLIGRAM(S): at 17:18

## 2019-10-27 RX ADMIN — OXYCODONE AND ACETAMINOPHEN 1 TABLET(S): 5; 325 TABLET ORAL at 15:22

## 2019-10-27 RX ADMIN — Medication 500 MILLIGRAM(S): at 05:35

## 2019-10-27 RX ADMIN — OXYCODONE AND ACETAMINOPHEN 1 TABLET(S): 5; 325 TABLET ORAL at 05:37

## 2019-10-27 RX ADMIN — SENNA PLUS 2 TABLET(S): 8.6 TABLET ORAL at 21:27

## 2019-10-27 RX ADMIN — Medication 650 MILLIGRAM(S): at 22:02

## 2019-10-27 RX ADMIN — Medication 325 MILLIGRAM(S): at 11:28

## 2019-10-27 RX ADMIN — Medication 50 MICROGRAM(S): at 05:35

## 2019-10-27 RX ADMIN — OXYCODONE AND ACETAMINOPHEN 1 TABLET(S): 5; 325 TABLET ORAL at 11:22

## 2019-10-27 RX ADMIN — OXYCODONE AND ACETAMINOPHEN 1 TABLET(S): 5; 325 TABLET ORAL at 01:49

## 2019-10-27 RX ADMIN — OXYCODONE AND ACETAMINOPHEN 1 TABLET(S): 5; 325 TABLET ORAL at 02:19

## 2019-10-28 LAB
-  AMPICILLIN: SIGNIFICANT CHANGE UP
-  CIPROFLOXACIN: SIGNIFICANT CHANGE UP
-  LEVOFLOXACIN: SIGNIFICANT CHANGE UP
-  NITROFURANTOIN: SIGNIFICANT CHANGE UP
-  TETRACYCLINE: SIGNIFICANT CHANGE UP
-  VANCOMYCIN: SIGNIFICANT CHANGE UP
ANION GAP SERPL CALC-SCNC: 10 MMOL/L — SIGNIFICANT CHANGE UP (ref 7–14)
BUN SERPL-MCNC: 19 MG/DL — SIGNIFICANT CHANGE UP (ref 10–20)
CALCIUM SERPL-MCNC: 8.5 MG/DL — SIGNIFICANT CHANGE UP (ref 8.5–10.1)
CHLORIDE SERPL-SCNC: 98 MMOL/L — SIGNIFICANT CHANGE UP (ref 98–110)
CO2 SERPL-SCNC: 31 MMOL/L — SIGNIFICANT CHANGE UP (ref 17–32)
CREAT SERPL-MCNC: 1 MG/DL — SIGNIFICANT CHANGE UP (ref 0.7–1.5)
CULTURE RESULTS: SIGNIFICANT CHANGE UP
GLUCOSE SERPL-MCNC: 121 MG/DL — HIGH (ref 70–99)
HCT VFR BLD CALC: 29.8 % — LOW (ref 37–47)
HGB BLD-MCNC: 9.7 G/DL — LOW (ref 12–16)
MAGNESIUM SERPL-MCNC: 2.4 MG/DL — SIGNIFICANT CHANGE UP (ref 1.8–2.4)
MCHC RBC-ENTMCNC: 28.3 PG — SIGNIFICANT CHANGE UP (ref 27–31)
MCHC RBC-ENTMCNC: 32.6 G/DL — SIGNIFICANT CHANGE UP (ref 32–37)
MCV RBC AUTO: 86.9 FL — SIGNIFICANT CHANGE UP (ref 81–99)
METHOD TYPE: SIGNIFICANT CHANGE UP
NRBC # BLD: 0 /100 WBCS — SIGNIFICANT CHANGE UP (ref 0–0)
ORGANISM # SPEC MICROSCOPIC CNT: SIGNIFICANT CHANGE UP
PLATELET # BLD AUTO: 338 K/UL — SIGNIFICANT CHANGE UP (ref 130–400)
POTASSIUM SERPL-MCNC: 4.7 MMOL/L — SIGNIFICANT CHANGE UP (ref 3.5–5)
POTASSIUM SERPL-SCNC: 4.7 MMOL/L — SIGNIFICANT CHANGE UP (ref 3.5–5)
RBC # BLD: 3.43 M/UL — LOW (ref 4.2–5.4)
RBC # FLD: 13.7 % — SIGNIFICANT CHANGE UP (ref 11.5–14.5)
SODIUM SERPL-SCNC: 139 MMOL/L — SIGNIFICANT CHANGE UP (ref 135–146)
SPECIMEN SOURCE: SIGNIFICANT CHANGE UP
WBC # BLD: 13.06 K/UL — HIGH (ref 4.8–10.8)
WBC # FLD AUTO: 13.06 K/UL — HIGH (ref 4.8–10.8)

## 2019-10-28 RX ORDER — ONDANSETRON 8 MG/1
4 TABLET, FILM COATED ORAL
Refills: 0 | Status: COMPLETED | OUTPATIENT
Start: 2019-10-28 | End: 2019-10-31

## 2019-10-28 RX ORDER — FERROUS SULFATE 325(65) MG
325 TABLET ORAL
Refills: 0 | Status: DISCONTINUED | OUTPATIENT
Start: 2019-10-28 | End: 2019-11-08

## 2019-10-28 RX ORDER — ONDANSETRON 8 MG/1
4 TABLET, FILM COATED ORAL EVERY 8 HOURS
Refills: 0 | Status: DISCONTINUED | OUTPATIENT
Start: 2019-10-28 | End: 2019-11-08

## 2019-10-28 RX ADMIN — Medication 50 MICROGRAM(S): at 05:18

## 2019-10-28 RX ADMIN — OXYCODONE AND ACETAMINOPHEN 1 TABLET(S): 5; 325 TABLET ORAL at 03:41

## 2019-10-28 RX ADMIN — LOSARTAN POTASSIUM 100 MILLIGRAM(S): 100 TABLET, FILM COATED ORAL at 05:19

## 2019-10-28 RX ADMIN — OXYCODONE AND ACETAMINOPHEN 1 TABLET(S): 5; 325 TABLET ORAL at 14:48

## 2019-10-28 RX ADMIN — Medication 1 DROP(S): at 05:20

## 2019-10-28 RX ADMIN — Medication 500 MILLIGRAM(S): at 05:19

## 2019-10-28 RX ADMIN — Medication 1 TABLET(S): at 12:05

## 2019-10-28 RX ADMIN — Medication 500 MILLIGRAM(S): at 17:56

## 2019-10-28 RX ADMIN — PANTOPRAZOLE SODIUM 40 MILLIGRAM(S): 20 TABLET, DELAYED RELEASE ORAL at 05:21

## 2019-10-28 RX ADMIN — OXYCODONE AND ACETAMINOPHEN 1 TABLET(S): 5; 325 TABLET ORAL at 09:41

## 2019-10-29 LAB
CULTURE RESULTS: SIGNIFICANT CHANGE UP
SPECIMEN SOURCE: SIGNIFICANT CHANGE UP
SURGICAL PATHOLOGY STUDY: SIGNIFICANT CHANGE UP

## 2019-10-29 RX ORDER — LOSARTAN POTASSIUM 100 MG/1
100 TABLET, FILM COATED ORAL
Refills: 0 | Status: DISCONTINUED | OUTPATIENT
Start: 2019-10-30 | End: 2019-11-08

## 2019-10-29 RX ADMIN — OXYCODONE AND ACETAMINOPHEN 1 TABLET(S): 5; 325 TABLET ORAL at 17:00

## 2019-10-29 RX ADMIN — Medication 325 MILLIGRAM(S): at 17:20

## 2019-10-29 RX ADMIN — Medication 50 MICROGRAM(S): at 05:07

## 2019-10-29 RX ADMIN — OXYCODONE AND ACETAMINOPHEN 1 TABLET(S): 5; 325 TABLET ORAL at 03:13

## 2019-10-29 RX ADMIN — Medication 500 MILLIGRAM(S): at 05:09

## 2019-10-29 RX ADMIN — Medication 1 DROP(S): at 17:26

## 2019-10-29 RX ADMIN — OXYCODONE AND ACETAMINOPHEN 1 TABLET(S): 5; 325 TABLET ORAL at 17:23

## 2019-10-29 RX ADMIN — Medication 650 MILLIGRAM(S): at 10:54

## 2019-10-29 RX ADMIN — Medication 500 MILLIGRAM(S): at 17:20

## 2019-10-29 RX ADMIN — Medication 650 MILLIGRAM(S): at 10:52

## 2019-10-29 RX ADMIN — OXYCODONE AND ACETAMINOPHEN 1 TABLET(S): 5; 325 TABLET ORAL at 08:57

## 2019-10-29 RX ADMIN — OXYCODONE AND ACETAMINOPHEN 1 TABLET(S): 5; 325 TABLET ORAL at 13:00

## 2019-10-29 RX ADMIN — OXYCODONE AND ACETAMINOPHEN 1 TABLET(S): 5; 325 TABLET ORAL at 12:27

## 2019-10-29 RX ADMIN — Medication 1 DROP(S): at 05:07

## 2019-10-29 RX ADMIN — OXYCODONE AND ACETAMINOPHEN 1 TABLET(S): 5; 325 TABLET ORAL at 09:30

## 2019-10-30 RX ADMIN — LOSARTAN POTASSIUM 100 MILLIGRAM(S): 100 TABLET, FILM COATED ORAL at 12:28

## 2019-10-30 RX ADMIN — Medication 500 MILLIGRAM(S): at 17:24

## 2019-10-30 RX ADMIN — Medication 500 MILLIGRAM(S): at 08:11

## 2019-10-30 RX ADMIN — Medication 50 MICROGRAM(S): at 05:43

## 2019-10-30 RX ADMIN — Medication 1 DROP(S): at 05:44

## 2019-10-31 RX ORDER — OXYCODONE AND ACETAMINOPHEN 5; 325 MG/1; MG/1
1 TABLET ORAL EVERY 4 HOURS
Refills: 0 | Status: DISCONTINUED | OUTPATIENT
Start: 2019-11-02 | End: 2019-11-08

## 2019-10-31 RX ADMIN — Medication 650 MILLIGRAM(S): at 17:11

## 2019-10-31 RX ADMIN — Medication 650 MILLIGRAM(S): at 17:09

## 2019-10-31 RX ADMIN — Medication 650 MILLIGRAM(S): at 10:21

## 2019-10-31 RX ADMIN — Medication 50 MICROGRAM(S): at 05:44

## 2019-10-31 RX ADMIN — Medication 500 MILLIGRAM(S): at 06:37

## 2019-10-31 RX ADMIN — Medication 1 DROP(S): at 06:37

## 2019-10-31 RX ADMIN — LOSARTAN POTASSIUM 100 MILLIGRAM(S): 100 TABLET, FILM COATED ORAL at 12:13

## 2019-11-01 LAB
ANION GAP SERPL CALC-SCNC: 11 MMOL/L — SIGNIFICANT CHANGE UP (ref 7–14)
BUN SERPL-MCNC: 20 MG/DL — SIGNIFICANT CHANGE UP (ref 10–20)
CALCIUM SERPL-MCNC: 8.9 MG/DL — SIGNIFICANT CHANGE UP (ref 8.5–10.1)
CHLORIDE SERPL-SCNC: 98 MMOL/L — SIGNIFICANT CHANGE UP (ref 98–110)
CO2 SERPL-SCNC: 28 MMOL/L — SIGNIFICANT CHANGE UP (ref 17–32)
CREAT SERPL-MCNC: 0.9 MG/DL — SIGNIFICANT CHANGE UP (ref 0.7–1.5)
GLUCOSE SERPL-MCNC: 106 MG/DL — HIGH (ref 70–99)
HCT VFR BLD CALC: 31.3 % — LOW (ref 37–47)
HGB BLD-MCNC: 10.1 G/DL — LOW (ref 12–16)
MAGNESIUM SERPL-MCNC: 2.4 MG/DL — SIGNIFICANT CHANGE UP (ref 1.8–2.4)
MCHC RBC-ENTMCNC: 28 PG — SIGNIFICANT CHANGE UP (ref 27–31)
MCHC RBC-ENTMCNC: 32.3 G/DL — SIGNIFICANT CHANGE UP (ref 32–37)
MCV RBC AUTO: 86.7 FL — SIGNIFICANT CHANGE UP (ref 81–99)
NRBC # BLD: 0 /100 WBCS — SIGNIFICANT CHANGE UP (ref 0–0)
PLATELET # BLD AUTO: 431 K/UL — HIGH (ref 130–400)
POTASSIUM SERPL-MCNC: 5.1 MMOL/L — HIGH (ref 3.5–5)
POTASSIUM SERPL-SCNC: 5.1 MMOL/L — HIGH (ref 3.5–5)
RBC # BLD: 3.61 M/UL — LOW (ref 4.2–5.4)
RBC # FLD: 13.9 % — SIGNIFICANT CHANGE UP (ref 11.5–14.5)
SODIUM SERPL-SCNC: 137 MMOL/L — SIGNIFICANT CHANGE UP (ref 135–146)
WBC # BLD: 13.86 K/UL — HIGH (ref 4.8–10.8)
WBC # FLD AUTO: 13.86 K/UL — HIGH (ref 4.8–10.8)

## 2019-11-01 RX ADMIN — LOSARTAN POTASSIUM 100 MILLIGRAM(S): 100 TABLET, FILM COATED ORAL at 12:23

## 2019-11-01 RX ADMIN — Medication 50 MICROGRAM(S): at 05:35

## 2019-11-01 RX ADMIN — Medication 650 MILLIGRAM(S): at 12:23

## 2019-11-01 RX ADMIN — Medication 650 MILLIGRAM(S): at 17:09

## 2019-11-01 RX ADMIN — Medication 650 MILLIGRAM(S): at 17:11

## 2019-11-01 RX ADMIN — Medication 650 MILLIGRAM(S): at 13:14

## 2019-11-01 RX ADMIN — Medication 650 MILLIGRAM(S): at 04:54

## 2019-11-01 RX ADMIN — Medication 650 MILLIGRAM(S): at 07:10

## 2019-11-02 RX ORDER — POLYETHYLENE GLYCOL 3350 17 G/17G
17 POWDER, FOR SOLUTION ORAL
Refills: 0 | Status: DISCONTINUED | OUTPATIENT
Start: 2019-11-02 | End: 2019-11-08

## 2019-11-02 RX ADMIN — Medication 650 MILLIGRAM(S): at 09:03

## 2019-11-02 RX ADMIN — Medication 650 MILLIGRAM(S): at 14:38

## 2019-11-02 RX ADMIN — Medication 650 MILLIGRAM(S): at 16:00

## 2019-11-02 RX ADMIN — Medication 50 MICROGRAM(S): at 05:06

## 2019-11-02 RX ADMIN — LOSARTAN POTASSIUM 100 MILLIGRAM(S): 100 TABLET, FILM COATED ORAL at 14:36

## 2019-11-02 RX ADMIN — Medication 650 MILLIGRAM(S): at 09:06

## 2019-11-02 RX ADMIN — PANTOPRAZOLE SODIUM 40 MILLIGRAM(S): 20 TABLET, DELAYED RELEASE ORAL at 06:12

## 2019-11-03 RX ADMIN — Medication 650 MILLIGRAM(S): at 08:21

## 2019-11-03 RX ADMIN — Medication 650 MILLIGRAM(S): at 08:24

## 2019-11-03 RX ADMIN — LOSARTAN POTASSIUM 100 MILLIGRAM(S): 100 TABLET, FILM COATED ORAL at 12:43

## 2019-11-03 RX ADMIN — Medication 650 MILLIGRAM(S): at 19:30

## 2019-11-03 RX ADMIN — Medication 650 MILLIGRAM(S): at 16:57

## 2019-11-03 RX ADMIN — PANTOPRAZOLE SODIUM 40 MILLIGRAM(S): 20 TABLET, DELAYED RELEASE ORAL at 06:01

## 2019-11-03 RX ADMIN — ONDANSETRON 4 MILLIGRAM(S): 8 TABLET, FILM COATED ORAL at 03:59

## 2019-11-03 RX ADMIN — Medication 1 DROP(S): at 05:59

## 2019-11-03 RX ADMIN — Medication 50 MICROGRAM(S): at 05:59

## 2019-11-03 RX ADMIN — Medication 1 DROP(S): at 16:56

## 2019-11-04 LAB
ANION GAP SERPL CALC-SCNC: 9 MMOL/L — SIGNIFICANT CHANGE UP (ref 7–14)
BUN SERPL-MCNC: 22 MG/DL — HIGH (ref 10–20)
CALCIUM SERPL-MCNC: 8.7 MG/DL — SIGNIFICANT CHANGE UP (ref 8.5–10.1)
CHLORIDE SERPL-SCNC: 98 MMOL/L — SIGNIFICANT CHANGE UP (ref 98–110)
CO2 SERPL-SCNC: 29 MMOL/L — SIGNIFICANT CHANGE UP (ref 17–32)
CREAT SERPL-MCNC: 0.9 MG/DL — SIGNIFICANT CHANGE UP (ref 0.7–1.5)
GLUCOSE SERPL-MCNC: 99 MG/DL — SIGNIFICANT CHANGE UP (ref 70–99)
HCT VFR BLD CALC: 33.1 % — LOW (ref 37–47)
HGB BLD-MCNC: 10.6 G/DL — LOW (ref 12–16)
MAGNESIUM SERPL-MCNC: 2.3 MG/DL — SIGNIFICANT CHANGE UP (ref 1.8–2.4)
MCHC RBC-ENTMCNC: 28.2 PG — SIGNIFICANT CHANGE UP (ref 27–31)
MCHC RBC-ENTMCNC: 32 G/DL — SIGNIFICANT CHANGE UP (ref 32–37)
MCV RBC AUTO: 88 FL — SIGNIFICANT CHANGE UP (ref 81–99)
NRBC # BLD: 0 /100 WBCS — SIGNIFICANT CHANGE UP (ref 0–0)
PLATELET # BLD AUTO: 444 K/UL — HIGH (ref 130–400)
POTASSIUM SERPL-MCNC: 4.6 MMOL/L — SIGNIFICANT CHANGE UP (ref 3.5–5)
POTASSIUM SERPL-SCNC: 4.6 MMOL/L — SIGNIFICANT CHANGE UP (ref 3.5–5)
RBC # BLD: 3.76 M/UL — LOW (ref 4.2–5.4)
RBC # FLD: 14.2 % — SIGNIFICANT CHANGE UP (ref 11.5–14.5)
SODIUM SERPL-SCNC: 136 MMOL/L — SIGNIFICANT CHANGE UP (ref 135–146)
WBC # BLD: 13.67 K/UL — HIGH (ref 4.8–10.8)
WBC # FLD AUTO: 13.67 K/UL — HIGH (ref 4.8–10.8)

## 2019-11-04 RX ADMIN — Medication 1 DROP(S): at 05:19

## 2019-11-04 RX ADMIN — ONDANSETRON 4 MILLIGRAM(S): 8 TABLET, FILM COATED ORAL at 18:02

## 2019-11-04 RX ADMIN — Medication 650 MILLIGRAM(S): at 12:30

## 2019-11-04 RX ADMIN — Medication 650 MILLIGRAM(S): at 10:08

## 2019-11-04 RX ADMIN — Medication 50 MICROGRAM(S): at 05:19

## 2019-11-04 RX ADMIN — LOSARTAN POTASSIUM 100 MILLIGRAM(S): 100 TABLET, FILM COATED ORAL at 12:33

## 2019-11-05 RX ADMIN — Medication 650 MILLIGRAM(S): at 14:00

## 2019-11-05 RX ADMIN — Medication 50 MICROGRAM(S): at 05:29

## 2019-11-05 RX ADMIN — PANTOPRAZOLE SODIUM 40 MILLIGRAM(S): 20 TABLET, DELAYED RELEASE ORAL at 06:42

## 2019-11-05 RX ADMIN — Medication 650 MILLIGRAM(S): at 12:58

## 2019-11-05 RX ADMIN — Medication 650 MILLIGRAM(S): at 09:26

## 2019-11-05 RX ADMIN — Medication 650 MILLIGRAM(S): at 09:17

## 2019-11-05 RX ADMIN — LOSARTAN POTASSIUM 100 MILLIGRAM(S): 100 TABLET, FILM COATED ORAL at 13:03

## 2019-11-06 PROCEDURE — 73501 X-RAY EXAM HIP UNI 1 VIEW: CPT | Mod: 26,RT

## 2019-11-06 RX ADMIN — Medication 650 MILLIGRAM(S): at 12:22

## 2019-11-06 RX ADMIN — Medication 650 MILLIGRAM(S): at 08:26

## 2019-11-06 RX ADMIN — LOSARTAN POTASSIUM 100 MILLIGRAM(S): 100 TABLET, FILM COATED ORAL at 12:23

## 2019-11-06 RX ADMIN — Medication 1 DROP(S): at 17:34

## 2019-11-06 RX ADMIN — Medication 650 MILLIGRAM(S): at 08:24

## 2019-11-06 RX ADMIN — Medication 650 MILLIGRAM(S): at 19:22

## 2019-11-06 RX ADMIN — Medication 50 MICROGRAM(S): at 05:40

## 2019-11-07 ENCOUNTER — TRANSCRIPTION ENCOUNTER (OUTPATIENT)
Age: 84
End: 2019-11-07

## 2019-11-07 PROCEDURE — 93970 EXTREMITY STUDY: CPT | Mod: 26

## 2019-11-07 RX ORDER — ASPIRIN/CALCIUM CARB/MAGNESIUM 324 MG
1 TABLET ORAL
Qty: 0 | Refills: 0 | DISCHARGE
Start: 2019-11-07

## 2019-11-07 RX ORDER — ENOXAPARIN SODIUM 100 MG/ML
40 INJECTION SUBCUTANEOUS DAILY
Refills: 0 | Status: DISCONTINUED | OUTPATIENT
Start: 2019-11-07 | End: 2019-11-08

## 2019-11-07 RX ORDER — LEVOTHYROXINE SODIUM 125 MCG
1 TABLET ORAL
Qty: 0 | Refills: 0 | DISCHARGE

## 2019-11-07 RX ORDER — ASPIRIN/CALCIUM CARB/MAGNESIUM 324 MG
81 TABLET ORAL EVERY 12 HOURS
Refills: 0 | Status: DISCONTINUED | OUTPATIENT
Start: 2019-11-07 | End: 2019-11-08

## 2019-11-07 RX ORDER — DOCUSATE SODIUM 100 MG
15 CAPSULE ORAL
Qty: 0 | Refills: 0 | DISCHARGE

## 2019-11-07 RX ORDER — ESOMEPRAZOLE MAGNESIUM 40 MG/1
1 CAPSULE, DELAYED RELEASE ORAL
Qty: 0 | Refills: 0 | DISCHARGE

## 2019-11-07 RX ORDER — ASPIRIN/CALCIUM CARB/MAGNESIUM 324 MG
81 TABLET ORAL ONCE
Refills: 0 | Status: COMPLETED | OUTPATIENT
Start: 2019-11-07 | End: 2019-11-07

## 2019-11-07 RX ADMIN — Medication 650 MILLIGRAM(S): at 08:34

## 2019-11-07 RX ADMIN — Medication 50 MICROGRAM(S): at 05:42

## 2019-11-07 RX ADMIN — Medication 650 MILLIGRAM(S): at 16:40

## 2019-11-07 RX ADMIN — LOSARTAN POTASSIUM 100 MILLIGRAM(S): 100 TABLET, FILM COATED ORAL at 12:01

## 2019-11-07 RX ADMIN — Medication 650 MILLIGRAM(S): at 08:36

## 2019-11-07 RX ADMIN — Medication 81 MILLIGRAM(S): at 16:40

## 2019-11-07 RX ADMIN — Medication 650 MILLIGRAM(S): at 17:20

## 2019-11-07 NOTE — DISCHARGE NOTE PROVIDER - NSDCFUSCHEDAPPT_GEN_ALL_CORE_FT
YEIMY FUENTES ; 12/09/2019 ; [facility] [place] [address] YEIMY FUENTES ; 12/09/2019 ; \[facility]\"" [place] [address]

## 2019-11-07 NOTE — DISCHARGE NOTE PROVIDER - NSDCMRMEDTOKEN_GEN_ALL_CORE_FT
[Negative] : Heme/Lymph
acetaminophen 325 mg oral tablet: 2 tab(s) orally every 4 hours, As needed, Mild Pain (1 - 3)  aspirin 81 mg oral delayed release tablet: 1 tab(s) orally every 12 hours  D3 with Calcium 25 mcg (1000 intl units)-90 mg oral tablet: 1 tab(s) orally once a day  ferrous sulfate 324 mg (65 mg elemental iron) oral tablet: orally once a day  irbesartan 300 mg oral tablet: 1 tab(s) orally once a day  Multiple Vitamins oral capsule: 1 cap(s) orally once a day  NexIUM 24HR 20 mg oral delayed release capsule: 1 cap(s) orally once a day  Systane ophthalmic solution: 1 drop(s) to each affected eye 2 times a day  Vitamin B12 100 mcg oral tablet: 1 tab(s) orally once a day

## 2019-11-07 NOTE — DISCHARGE NOTE PROVIDER - CARE PROVIDER_API CALL
Danial Avila)  Orthopaedic Surgery Surgery  [address] [place] [address]  Phone: [phone]  Fax: [phone]  Follow Up Time:

## 2019-11-07 NOTE — DISCHARGE NOTE PROVIDER - HOSPITAL COURSE
Hospital Course		    85 yo female pmh of HTN, hypothyroidism, GERD, Tonsillar Ca due to HPV s/p tonsillectomy and base of tongue resection, Breast Ca s/p Rt Lumpectomy, s/p BSO and JACKELYN, s/p parathyroid surgery, laparoscopic partial colectomy (Colovesical fistula) p/w Right hip pain and inability to ambulate s/p mechanical fall.     ..During hospital course, patient underwent R. Hemiarthroplasty on 10/22. Her course was complicated by UTI treated with IV antibiotics.   she was evaluated by rehab team and deemed a good candidate for acute rehab , she was transferred to rehab and she participated in 3 hour daily acute rehab , now she is able to ambulate with rolling walker  . her pain is controlled on tylenol and as per pt she cannot tolerate narcotics , She needs to follow up with ortho doctor Margarita in 2 weeks , she had a2 week post op xray of rt hip showed it is in alignment

## 2019-11-08 ENCOUNTER — TRANSCRIPTION ENCOUNTER (OUTPATIENT)
Age: 84
End: 2019-11-08

## 2019-11-08 LAB
ANION GAP SERPL CALC-SCNC: 10 MMOL/L — SIGNIFICANT CHANGE UP (ref 7–14)
BUN SERPL-MCNC: 24 MG/DL — HIGH (ref 10–20)
CALCIUM SERPL-MCNC: 8.9 MG/DL — SIGNIFICANT CHANGE UP (ref 8.5–10.1)
CHLORIDE SERPL-SCNC: 101 MMOL/L — SIGNIFICANT CHANGE UP (ref 98–110)
CO2 SERPL-SCNC: 28 MMOL/L — SIGNIFICANT CHANGE UP (ref 17–32)
CREAT SERPL-MCNC: 0.9 MG/DL — SIGNIFICANT CHANGE UP (ref 0.7–1.5)
GLUCOSE SERPL-MCNC: 98 MG/DL — SIGNIFICANT CHANGE UP (ref 70–99)
HCT VFR BLD CALC: 33.8 % — LOW (ref 37–47)
HGB BLD-MCNC: 10.7 G/DL — LOW (ref 12–16)
MAGNESIUM SERPL-MCNC: 2.3 MG/DL — SIGNIFICANT CHANGE UP (ref 1.8–2.4)
MCHC RBC-ENTMCNC: 27.7 PG — SIGNIFICANT CHANGE UP (ref 27–31)
MCHC RBC-ENTMCNC: 31.7 G/DL — LOW (ref 32–37)
MCV RBC AUTO: 87.6 FL — SIGNIFICANT CHANGE UP (ref 81–99)
NRBC # BLD: 0 /100 WBCS — SIGNIFICANT CHANGE UP (ref 0–0)
PLATELET # BLD AUTO: 331 K/UL — SIGNIFICANT CHANGE UP (ref 130–400)
POTASSIUM SERPL-MCNC: 4.9 MMOL/L — SIGNIFICANT CHANGE UP (ref 3.5–5)
POTASSIUM SERPL-SCNC: 4.9 MMOL/L — SIGNIFICANT CHANGE UP (ref 3.5–5)
RBC # BLD: 3.86 M/UL — LOW (ref 4.2–5.4)
RBC # FLD: 14.4 % — SIGNIFICANT CHANGE UP (ref 11.5–14.5)
SODIUM SERPL-SCNC: 139 MMOL/L — SIGNIFICANT CHANGE UP (ref 135–146)
WBC # BLD: 9.34 K/UL — SIGNIFICANT CHANGE UP (ref 4.8–10.8)
WBC # FLD AUTO: 9.34 K/UL — SIGNIFICANT CHANGE UP (ref 4.8–10.8)

## 2019-11-08 RX ADMIN — Medication 50 MICROGRAM(S): at 05:50

## 2019-11-08 RX ADMIN — Medication 81 MILLIGRAM(S): at 08:37

## 2019-11-08 RX ADMIN — LOSARTAN POTASSIUM 100 MILLIGRAM(S): 100 TABLET, FILM COATED ORAL at 11:27

## 2019-11-08 NOTE — DISCHARGE NOTE NURSING/CASE MANAGEMENT/SOCIAL WORK - PATIENT PORTAL LINK FT
You can access the FollowMyHealth Patient Portal offered by [facility] by registering at the following website: [email or web address]. By joining [employer]’s FollowMyHealth portal, you will also be able to view your health information using other applications (apps) compatible with our system.

## 2019-11-10 DIAGNOSIS — I10 ESSENTIAL (PRIMARY) HYPERTENSION: ICD-10-CM

## 2019-11-10 DIAGNOSIS — Z88.0 ALLERGY STATUS TO PENICILLIN: ICD-10-CM

## 2019-11-10 DIAGNOSIS — Z85.3 PERSONAL HISTORY OF MALIGNANT NEOPLASM OF BREAST: ICD-10-CM

## 2019-11-10 DIAGNOSIS — E03.9 HYPOTHYROIDISM, UNSPECIFIED: ICD-10-CM

## 2019-11-10 DIAGNOSIS — K21.9 GASTRO-ESOPHAGEAL REFLUX DISEASE WITHOUT ESOPHAGITIS: ICD-10-CM

## 2019-11-10 DIAGNOSIS — G93.41 METABOLIC ENCEPHALOPATHY: ICD-10-CM
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
## 2019-11-10 DIAGNOSIS — Z85.89 PERSONAL HISTORY OF MALIGNANT NEOPLASM OF OTHER ORGANS AND SYSTEMS: ICD-10-CM

## 2019-11-10 DIAGNOSIS — S72.001A FRACTURE OF UNSPECIFIED PART OF NECK OF RIGHT FEMUR, INITIAL ENCOUNTER FOR CLOSED FRACTURE: ICD-10-CM

## 2019-11-10 DIAGNOSIS — N39.0 URINARY TRACT INFECTION, SITE NOT SPECIFIED: ICD-10-CM

## 2019-11-10 DIAGNOSIS — S72.031A DISPLACED MIDCERVICAL FRACTURE OF RIGHT FEMUR, INITIAL ENCOUNTER FOR CLOSED FRACTURE: ICD-10-CM
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
## 2019-11-10 DIAGNOSIS — Z90.49 ACQUIRED ABSENCE OF OTHER SPECIFIED PARTS OF DIGESTIVE TRACT: ICD-10-CM

## 2019-11-10 DIAGNOSIS — D47.2 MONOCLONAL GAMMOPATHY: ICD-10-CM

## 2019-11-10 DIAGNOSIS — M16.12 UNILATERAL PRIMARY OSTEOARTHRITIS, LEFT HIP: ICD-10-CM

## 2019-11-10 DIAGNOSIS — A41.9 SEPSIS, UNSPECIFIED ORGANISM: ICD-10-CM

## 2019-11-13 DIAGNOSIS — E03.9 HYPOTHYROIDISM, UNSPECIFIED: ICD-10-CM

## 2019-11-13 DIAGNOSIS — Z90.710 ACQUIRED ABSENCE OF BOTH CERVIX AND UTERUS: ICD-10-CM

## 2019-11-13 DIAGNOSIS — N39.0 URINARY TRACT INFECTION, SITE NOT SPECIFIED: ICD-10-CM

## 2019-11-13 DIAGNOSIS — K21.9 GASTRO-ESOPHAGEAL REFLUX DISEASE WITHOUT ESOPHAGITIS: ICD-10-CM

## 2019-11-13 DIAGNOSIS — I10 ESSENTIAL (PRIMARY) HYPERTENSION: ICD-10-CM

## 2019-11-13 DIAGNOSIS — D47.2 MONOCLONAL GAMMOPATHY: ICD-10-CM

## 2019-11-13 DIAGNOSIS — S72.031D DISPLACED MIDCERVICAL FRACTURE OF RIGHT FEMUR, SUBSEQUENT ENCOUNTER FOR CLOSED FRACTURE WITH ROUTINE HEALING: ICD-10-CM

## 2019-11-13 DIAGNOSIS — Z85.79 PERSONAL HISTORY OF OTHER MALIGNANT NEOPLASMS OF LYMPHOID, HEMATOPOIETIC AND RELATED TISSUES: ICD-10-CM

## 2019-11-13 DIAGNOSIS — Z96.641 PRESENCE OF RIGHT ARTIFICIAL HIP JOINT: ICD-10-CM

## 2019-11-13 DIAGNOSIS — Z88.1 ALLERGY STATUS TO OTHER ANTIBIOTIC AGENTS STATUS: ICD-10-CM

## 2019-11-13 DIAGNOSIS — Z90.722 ACQUIRED ABSENCE OF OVARIES, BILATERAL: ICD-10-CM

## 2019-11-13 DIAGNOSIS — Z85.3 PERSONAL HISTORY OF MALIGNANT NEOPLASM OF BREAST: ICD-10-CM

## 2019-11-13 DIAGNOSIS — Z88.0 ALLERGY STATUS TO PENICILLIN: ICD-10-CM

## 2019-11-13 DIAGNOSIS — Z98.890 OTHER SPECIFIED POSTPROCEDURAL STATES: ICD-10-CM

## 2019-12-09 ENCOUNTER — APPOINTMENT (OUTPATIENT)
Dept: SURGERY | Facility: CLINIC | Age: 84
End: 2019-12-09
Payer: MEDICARE

## 2019-12-09 VITALS
WEIGHT: 122 LBS | DIASTOLIC BLOOD PRESSURE: 82 MMHG | SYSTOLIC BLOOD PRESSURE: 146 MMHG | HEIGHT: 65 IN | BODY MASS INDEX: 20.33 KG/M2

## 2019-12-09 DIAGNOSIS — N32.1 VESICOINTESTINAL FISTULA: ICD-10-CM

## 2019-12-09 PROCEDURE — 99213 OFFICE O/P EST LOW 20 MIN: CPT

## 2019-12-09 RX ORDER — ASCORBIC ACID 500 MG
TABLET ORAL
Refills: 0 | Status: ACTIVE | COMMUNITY

## 2019-12-09 RX ORDER — UBIDECARENONE/VIT E ACET 100MG-5
CAPSULE ORAL
Refills: 0 | Status: ACTIVE | COMMUNITY

## 2019-12-09 RX ORDER — DOCUSATE SODIUM 100 MG/1
CAPSULE ORAL
Refills: 0 | Status: ACTIVE | COMMUNITY

## 2019-12-09 NOTE — ASSESSMENT
[FreeTextEntry1] : Benign breast cancer surveillance examination. The patient will return here in one year for reevaluation or sooner as needed. Next bilateral mammogram will be in October of Year. All their questions were answered and they understand and agree. We will likely discontinue her annual mammograms after age 87 and the rationale for this was also discussed in full.

## 2019-12-09 NOTE — PHYSICAL EXAM
[Normal Thyroid] : the thyroid was normal [Normal Breath Sounds] : Normal breath sounds [Normal Heart Sounds] : normal heart sounds [de-identified] : healthy in appearance [de-identified] : no cervical or supraclavicular lymphadenopathy, trachea midline [de-identified] : No nipple discharge, nipple retraction, suspicious skin changes bilaterally. Bilateral healed incisions as noted with some scar retraction in the right lower inner quadrant. No new masses or areas of suspicion are palpable in either breast. No axillary lymphadenopathy bilaterally.

## 2019-12-09 NOTE — HISTORY OF PRESENT ILLNESS
[de-identified] : The patient comes with her daughter and returns today after a 2 year absence, to resume her breast cancer surveillance. She had a right breast WLE/SLNB/RT at [facility] with postoperative chemotherapy in 2004 she has done well since then and had a benign biopsy in the left breast in the distant past

## 2020-03-09 ENCOUNTER — OUTPATIENT (OUTPATIENT)
Dept: OUTPATIENT SERVICES | Facility: HOSPITAL | Age: 85
LOS: 1 days | Discharge: HOME | End: 2020-03-09

## 2020-03-09 ENCOUNTER — LABORATORY RESULT (OUTPATIENT)
Age: 85
End: 2020-03-09

## 2020-03-09 ENCOUNTER — APPOINTMENT (OUTPATIENT)
Dept: HEMATOLOGY ONCOLOGY | Facility: CLINIC | Age: 85
End: 2020-03-09
Payer: MEDICARE

## 2020-03-09 VITALS
TEMPERATURE: 96.9 F | HEART RATE: 95 BPM | SYSTOLIC BLOOD PRESSURE: 165 MMHG | WEIGHT: 124 LBS | BODY MASS INDEX: 20.66 KG/M2 | DIASTOLIC BLOOD PRESSURE: 74 MMHG | RESPIRATION RATE: 14 BRPM | HEIGHT: 65 IN

## 2020-03-09 DIAGNOSIS — H26.9 UNSPECIFIED CATARACT: Chronic | ICD-10-CM

## 2020-03-09 DIAGNOSIS — Z98.890 OTHER SPECIFIED POSTPROCEDURAL STATES: Chronic | ICD-10-CM

## 2020-03-09 DIAGNOSIS — Z90.49 ACQUIRED ABSENCE OF OTHER SPECIFIED PARTS OF DIGESTIVE TRACT: Chronic | ICD-10-CM

## 2020-03-09 DIAGNOSIS — Z90.710 ACQUIRED ABSENCE OF BOTH CERVIX AND UTERUS: Chronic | ICD-10-CM

## 2020-03-09 LAB
ALBUMIN SERPL ELPH-MCNC: 4.6 G/DL
ALP BLD-CCNC: 105 U/L
ALT SERPL-CCNC: 13 U/L
ANION GAP SERPL CALC-SCNC: 12 MMOL/L
AST SERPL-CCNC: 17 U/L
BILIRUB SERPL-MCNC: 0.3 MG/DL
BUN SERPL-MCNC: 20 MG/DL
CALCIUM SERPL-MCNC: 9.1 MG/DL
CHLORIDE SERPL-SCNC: 101 MMOL/L
CO2 SERPL-SCNC: 27 MMOL/L
CREAT SERPL-MCNC: 1 MG/DL
GLUCOSE SERPL-MCNC: 95 MG/DL
HCT VFR BLD CALC: 41.5 %
HGB BLD-MCNC: 13.8 G/DL
MCHC RBC-ENTMCNC: 27.7 PG
MCHC RBC-ENTMCNC: 33.3 G/DL
MCV RBC AUTO: 83.2 FL
PLATELET # BLD AUTO: 211 K/UL
PMV BLD: 9.1 FL
POTASSIUM SERPL-SCNC: 4.3 MMOL/L
PROT SERPL-MCNC: 8.4 G/DL
RBC # BLD: 4.99 M/UL
RBC # FLD: 14.3 %
SODIUM SERPL-SCNC: 140 MMOL/L
WBC # FLD AUTO: 8.85 K/UL

## 2020-03-09 PROCEDURE — 99213 OFFICE O/P EST LOW 20 MIN: CPT

## 2020-03-10 LAB
DEPRECATED KAPPA LC FREE/LAMBDA SER: 2.71 RATIO
IGA SER QL IEP: 117 MG/DL
IGG SER QL IEP: 2331 MG/DL
IGM SER QL IEP: 230 MG/DL
KAPPA LC CSF-MCNC: 2.05 MG/DL
KAPPA LC SERPL-MCNC: 5.55 MG/DL

## 2020-03-10 NOTE — REASON FOR VISIT
[Follow-Up Visit] : a follow-up [Family Member] : family member [FreeTextEntry2] : IgG MGUS, H/O SCC of base of tongue, remote h/o breast cancer

## 2020-03-10 NOTE — HISTORY OF PRESENT ILLNESS
[de-identified] : The patient returns for a f/u for h/o IgG MGUS. Last M-Keaton was 0.5. Pt had mild anemia in Oct 2019. She had an acute drop after surgery for right hip fracture. Her FLC ratio was 4.04/1.64 = 2.46. Her IgG level was stable at 2253. Pt denies having any new complaints.\par \par She has been following with ENT for h/o P16 positive SCC, s/p resection and b/l neck dissection with 1/40 LNs having ? mets. She opted not to get RT, given the minimal benefits and significant toxicity. She follows with ENT at [facility] and had recent scans and fiberoptic exam which, according to the patient, were negative. She has a f/u appointment in April 2020. Denies having any new oropharyngeal/tongue masses. Feels like having persistent right cervical lump. No recent weight loss. \par \par Pt also has remote h/o right breast Ca s/p lumpectomy + re-excision + RT and chemo in 2004. She saw Dr Jacobson in Dec 2019. Mammogram from Nov 2019 was negative.\par \par She is also having regular US for thyroid nodules.   \par \par

## 2020-03-10 NOTE — PHYSICAL EXAM
[Restricted in physically strenuous activity but ambulatory and able to carry out work of a light or sedentary nature] : Status 1- Restricted in physically strenuous activity but ambulatory and able to carry out work of a light or sedentary nature, e.g., light house work, office work [Normal] : affect appropriate [de-identified] : Eyeglasses noted [de-identified] : Arthritic changes present

## 2020-03-10 NOTE — REVIEW OF SYSTEMS
[Fatigue] : fatigue [Negative] : Allergic/Immunologic [Fever] : no fever [Chills] : no chills [Night Sweats] : no night sweats [Recent Change In Weight] : ~T no recent weight change [de-identified] : ?enlarged LN in neck

## 2020-03-10 NOTE — ASSESSMENT
[FreeTextEntry1] : #IgG Kappa MGUS: Will repeat CBC, CMP and myeloma panel.\par -- No e/o end organ damage or progression on previous blood work\par \par #SCC of tongue: P16 positive, S/P surgery with B/L LN dissection. ?1/40 LN positive for malignancy\par -- No RT was given as per the pt preference\par -- F/U with ENT at [facility] in Apr 2020. As per the pt, recent scans and fiberoptic exam did not show any evidence of recurrence\par \par #H/O Breast Ca: s/p lumpectomy + re-excision + RT and chemo in 2004. \par -- She saw Dr Jacobson in Dec 2019. Mammogram from Nov 2019 was negative. \par -- Next B/L mammogram in Nov 2020\par \par #Thyroid nodules: Thyroid US as per ENT and endocrine\par \par RTO in 6 months if the blood work is stable.

## 2020-03-11 LAB
ALBUMIN MFR SERPL ELPH: 52.4 %
ALBUMIN SERPL-MCNC: 4.4 G/DL
ALBUMIN/GLOB SERPL: 1.1 RATIO
ALPHA1 GLOB MFR SERPL ELPH: 3.7 %
ALPHA1 GLOB SERPL ELPH-MCNC: 0.3 G/DL
ALPHA2 GLOB MFR SERPL ELPH: 8 %
ALPHA2 GLOB SERPL ELPH-MCNC: 0.7 G/DL
B-GLOBULIN MFR SERPL ELPH: 8.1 %
B-GLOBULIN SERPL ELPH-MCNC: 0.7 G/DL
GAMMA GLOB FLD ELPH-MCNC: 2.3 G/DL
GAMMA GLOB MFR SERPL ELPH: 27.8 %
INTERPRETATION SERPL IEP-IMP: NORMAL
M PROTEIN MFR SERPL ELPH: 16.3 %
M PROTEIN SPEC IFE-MCNC: NORMAL
MONOCLON BAND OBS SERPL: 1.4 G/DL
PROT SERPL-MCNC: 8.4 G/DL

## 2020-03-12 DIAGNOSIS — Z85.3 PERSONAL HISTORY OF MALIGNANT NEOPLASM OF BREAST: ICD-10-CM

## 2020-03-12 DIAGNOSIS — C02.9 MALIGNANT NEOPLASM OF TONGUE, UNSPECIFIED: ICD-10-CM

## 2020-03-12 DIAGNOSIS — D47.2 MONOCLONAL GAMMOPATHY: ICD-10-CM

## 2020-09-14 ENCOUNTER — LABORATORY RESULT (OUTPATIENT)
Age: 85
End: 2020-09-14

## 2020-09-14 ENCOUNTER — APPOINTMENT (OUTPATIENT)
Dept: HEMATOLOGY ONCOLOGY | Facility: CLINIC | Age: 85
End: 2020-09-14
Payer: MEDICARE

## 2020-09-14 ENCOUNTER — OUTPATIENT (OUTPATIENT)
Dept: OUTPATIENT SERVICES | Facility: HOSPITAL | Age: 85
LOS: 1 days | Discharge: HOME | End: 2020-09-14

## 2020-09-14 VITALS
DIASTOLIC BLOOD PRESSURE: 70 MMHG | TEMPERATURE: 98 F | WEIGHT: 128 LBS | BODY MASS INDEX: 21.33 KG/M2 | HEIGHT: 65 IN | SYSTOLIC BLOOD PRESSURE: 148 MMHG | HEART RATE: 74 BPM | RESPIRATION RATE: 16 BRPM

## 2020-09-14 DIAGNOSIS — Z98.890 OTHER SPECIFIED POSTPROCEDURAL STATES: Chronic | ICD-10-CM

## 2020-09-14 DIAGNOSIS — Z90.49 ACQUIRED ABSENCE OF OTHER SPECIFIED PARTS OF DIGESTIVE TRACT: Chronic | ICD-10-CM

## 2020-09-14 DIAGNOSIS — H26.9 UNSPECIFIED CATARACT: Chronic | ICD-10-CM

## 2020-09-14 DIAGNOSIS — Z90.710 ACQUIRED ABSENCE OF BOTH CERVIX AND UTERUS: Chronic | ICD-10-CM

## 2020-09-14 LAB
HCT VFR BLD CALC: 40.8 %
HGB BLD-MCNC: 13.6 G/DL
MCHC RBC-ENTMCNC: 28.8 PG
MCHC RBC-ENTMCNC: 33.3 G/DL
MCV RBC AUTO: 86.3 FL
PLATELET # BLD AUTO: 222 K/UL
PMV BLD: 9.3 FL
RBC # BLD: 4.73 M/UL
RBC # FLD: 13.2 %
WBC # FLD AUTO: 9.18 K/UL

## 2020-09-14 PROCEDURE — 99213 OFFICE O/P EST LOW 20 MIN: CPT

## 2020-09-15 DIAGNOSIS — D47.2 MONOCLONAL GAMMOPATHY: ICD-10-CM

## 2020-09-15 DIAGNOSIS — C02.9 MALIGNANT NEOPLASM OF TONGUE, UNSPECIFIED: ICD-10-CM

## 2020-09-15 DIAGNOSIS — Z85.3 PERSONAL HISTORY OF MALIGNANT NEOPLASM OF BREAST: ICD-10-CM

## 2020-09-15 LAB
ALBUMIN SERPL ELPH-MCNC: 4.6 G/DL
ALP BLD-CCNC: 106 U/L
ALT SERPL-CCNC: 13 U/L
ANION GAP SERPL CALC-SCNC: 11 MMOL/L
AST SERPL-CCNC: 17 U/L
BILIRUB SERPL-MCNC: 0.4 MG/DL
BUN SERPL-MCNC: 19 MG/DL
CALCIUM SERPL-MCNC: 9.2 MG/DL
CHLORIDE SERPL-SCNC: 102 MMOL/L
CO2 SERPL-SCNC: 28 MMOL/L
CREAT SERPL-MCNC: 1 MG/DL
DEPRECATED KAPPA LC FREE/LAMBDA SER: 2.86 RATIO
GLUCOSE SERPL-MCNC: 92 MG/DL
IGA SER QL IEP: 113 MG/DL
IGG SER QL IEP: 2421 MG/DL
IGM SER QL IEP: 219 MG/DL
KAPPA LC CSF-MCNC: 1.45 MG/DL
KAPPA LC SERPL-MCNC: 4.15 MG/DL
POTASSIUM SERPL-SCNC: 4.3 MMOL/L
PROT SERPL-MCNC: 8.3 G/DL
SODIUM SERPL-SCNC: 141 MMOL/L

## 2020-09-15 NOTE — PHYSICAL EXAM
[Restricted in physically strenuous activity but ambulatory and able to carry out work of a light or sedentary nature] : Status 1- Restricted in physically strenuous activity but ambulatory and able to carry out work of a light or sedentary nature, e.g., light house work, office work [Normal] : well developed, well nourished, in no acute distress [de-identified] : Eyeglasses noted. Mild diplopia [de-identified] : Arthritic changes present

## 2020-09-15 NOTE — REVIEW OF SYSTEMS
[Fatigue] : fatigue [Negative] : Allergic/Immunologic [Fever] : no fever [Chills] : no chills [Night Sweats] : no night sweats [Recent Change In Weight] : ~T no recent weight change [Vision Problems] : vision problems [de-identified] : ?enlarged LN in neck

## 2020-09-15 NOTE — ASSESSMENT
[FreeTextEntry1] : #IgG Kappa MGUS: Will repeat CBC, CMP and myeloma panel.\par -- No evidence of end organ damage or progression on previous blood work.\par \par #SCC of tongue: P16 positive, S/P surgery with B/L LN dissection. ?1/40 LN positive for malignancy?\par -- No RT was given as per the pt preference\par -- F/U with ENT at [facility] in November 2020. As per the pt, recent scans and fiberoptic exam did not show any evidence of recurrence.\par \par #H/O Breast Ca: s/p lumpectomy + re-excision + RT and chemo in 2004. \par -- She saw Dr Jacobson in Dec 2019. Mammogram from Nov 2019 was negative. \par -- Next B/L mammogram in Nov 2020\par \par #Thyroid nodules: S/P biopsy which was negative.  Being followed by Dr. Martell with sonogram.\par \par RTO in 6 months if the blood work is stable.\par \par Case discussed and patient seen with Dr. Lao who agreed with the above.\par

## 2020-09-15 NOTE — HISTORY OF PRESENT ILLNESS
[de-identified] : The patient returns for a f/u for h/o IgG MGUS. Last M-Keaton was 0.5. Pt had mild anemia in Oct 2019. She had an acute drop after surgery for right hip fracture. Her FLC ratio was 4.04/1.64 = 2.46. Her IgG level was stable at 2253. Pt denies having any new complaints.\par \par She has been following with ENT for h/o P16 positive SCC, s/p resection and b/l neck dissection with 1/40 LNs having ? mets. She opted not to get RT, given the minimal benefits and significant toxicity. She follows with ENT at [facility] and had recent scans and fiberoptic exam which, according to the patient, were negative. She has a f/u appointment in April 2020. Denies having any new oropharyngeal/tongue masses. Feels like having persistent right cervical lump. No recent weight loss. \par \par Pt also has remote h/o right breast Ca s/p lumpectomy + re-excision + RT and chemo in 2004. She saw Dr Jacobson in Dec 2019. Mammogram from Nov 2019 was negative.\par \par She is also having regular US for thyroid nodules.   \par \par  [de-identified] : 9/14/20\par Patient here for follow up visit for MGUS, on observation;\par SCC of the tongue, S/P surgery and LN dissection, patient refused RT, being followed at [facility].\par Breast Ca, S/P surgery, RT and chemo completed in 2004.\par She has been doing well, although she admits to feeling more tired and fatigued recently.  Her appetite is not what it used to be, although her weight remains stable. She is getting affected emotionally by the Covid pandemic making her house-confined. \par She is being followed with fiberoptic scope Q 4 months at [facility].  She is due for annal mammogram in November. \par Patient denies cough, shortness of breath, fever, chills, or night sweats.  She has chronic R hip pain s/p hip fracture and replacement.\par \par

## 2020-09-16 LAB
ALBUMIN MFR SERPL ELPH: 52.8 %
ALBUMIN SERPL-MCNC: 4.4 G/DL
ALBUMIN/GLOB SERPL: 1.1 RATIO
ALPHA1 GLOB MFR SERPL ELPH: 3.4 %
ALPHA1 GLOB SERPL ELPH-MCNC: 0.3 G/DL
ALPHA2 GLOB MFR SERPL ELPH: 7.4 %
ALPHA2 GLOB SERPL ELPH-MCNC: 0.6 G/DL
B-GLOBULIN MFR SERPL ELPH: 8.5 %
B-GLOBULIN SERPL ELPH-MCNC: 0.7 G/DL
GAMMA GLOB FLD ELPH-MCNC: 2.3 G/DL
GAMMA GLOB MFR SERPL ELPH: 27.9 %
INTERPRETATION SERPL IEP-IMP: NORMAL
M PROTEIN MFR SERPL ELPH: 8.1 %
M PROTEIN SPEC IFE-MCNC: NORMAL
MONOCLON BAND OBS SERPL: 0.7 G/DL
PROT SERPL-MCNC: 8.3 G/DL

## 2021-01-23 NOTE — DISCHARGE NOTE PROVIDER - NSDCCPCAREPLAN_GEN_ALL_CORE_FT
PRINCIPAL DISCHARGE DIAGNOSIS  Diagnosis: Closed fracture of right hip, initial encounter  Assessment and Plan of Treatment: Fracture  A fracture is a break in one of your bones. This can occur from a variety of injuries, especially traumatic ones. Symptoms include pain, bruising, or swelling. Do not use the injured limb. If a fracture is in one of the bones below your waist, do not put weight on that limb unless instructed to do so by your healthcare provider. Crutches or a cane may have been provided. A splint or cast may have been applied by your health care provider. Make sure to keep it dry and follow up with an orthopedist as instructed.  SEEK IMMEDIATE MEDICAL CARE IF YOU HAVE ANY OF THE FOLLOWING SYMPTOMS: numbness, tingling, increasing pain, or weakness in any part of the injured limb.
No

## 2021-01-25 ENCOUNTER — APPOINTMENT (OUTPATIENT)
Dept: HEMATOLOGY ONCOLOGY | Facility: CLINIC | Age: 86
End: 2021-01-25
Payer: MEDICARE

## 2021-02-08 ENCOUNTER — LABORATORY RESULT (OUTPATIENT)
Age: 86
End: 2021-02-08

## 2021-02-08 ENCOUNTER — APPOINTMENT (OUTPATIENT)
Dept: HEMATOLOGY ONCOLOGY | Facility: CLINIC | Age: 86
End: 2021-02-08
Payer: MEDICARE

## 2021-02-08 VITALS
RESPIRATION RATE: 14 BRPM | HEART RATE: 81 BPM | BODY MASS INDEX: 21.16 KG/M2 | SYSTOLIC BLOOD PRESSURE: 149 MMHG | DIASTOLIC BLOOD PRESSURE: 66 MMHG | WEIGHT: 127 LBS | TEMPERATURE: 98.1 F | HEIGHT: 65 IN

## 2021-02-08 DIAGNOSIS — Z85.3 PERSONAL HISTORY OF MALIGNANT NEOPLASM OF BREAST: ICD-10-CM

## 2021-02-08 PROCEDURE — 99213 OFFICE O/P EST LOW 20 MIN: CPT

## 2021-02-09 LAB
ALBUMIN SERPL ELPH-MCNC: 4.5 G/DL
ALP BLD-CCNC: 97 U/L
ALT SERPL-CCNC: 11 U/L
ANION GAP SERPL CALC-SCNC: 11 MMOL/L
AST SERPL-CCNC: 17 U/L
BILIRUB SERPL-MCNC: 0.3 MG/DL
BUN SERPL-MCNC: 26 MG/DL
CALCIUM SERPL-MCNC: 9.4 MG/DL
CHLORIDE SERPL-SCNC: 101 MMOL/L
CO2 SERPL-SCNC: 28 MMOL/L
CREAT SERPL-MCNC: 1.1 MG/DL
GLUCOSE SERPL-MCNC: 100 MG/DL
HCT VFR BLD CALC: 41.1 %
HGB BLD-MCNC: 13.8 G/DL
MCHC RBC-ENTMCNC: 29.1 PG
MCHC RBC-ENTMCNC: 33.6 G/DL
MCV RBC AUTO: 86.5 FL
PLATELET # BLD AUTO: 256 K/UL
PMV BLD: 9.4 FL
POTASSIUM SERPL-SCNC: 4.7 MMOL/L
PROT SERPL-MCNC: 8.1 G/DL
RBC # BLD: 4.75 M/UL
RBC # FLD: 13.1 %
SODIUM SERPL-SCNC: 140 MMOL/L
WBC # FLD AUTO: 11.78 K/UL

## 2021-02-09 NOTE — PHYSICAL EXAM
[Restricted in physically strenuous activity but ambulatory and able to carry out work of a light or sedentary nature] : Status 1- Restricted in physically strenuous activity but ambulatory and able to carry out work of a light or sedentary nature, e.g., light house work, office work [Normal] : pharynx is unremarkable, moist mucus membrane, no oral lesions [de-identified] : Eyeglasses noted. Mild diplopia [de-identified] : Arthritic changes present

## 2021-02-09 NOTE — REVIEW OF SYSTEMS
[Fatigue] : fatigue [Vision Problems] : vision problems [Negative] : Allergic/Immunologic [Fever] : no fever [Chills] : no chills [Night Sweats] : no night sweats [Recent Change In Weight] : ~T no recent weight change [de-identified] : No palpable lymphadenopathy by her

## 2021-02-09 NOTE — HISTORY OF PRESENT ILLNESS
[de-identified] : The patient returns for a f/u for h/o IgG MGUS. Last M-Keaton was 0.5. Pt had mild anemia in Oct 2019. She had an acute drop after surgery for right hip fracture. Her FLC ratio was 4.04/1.64 = 2.46. Her IgG level was stable at 2253. Pt denies having any new complaints.\par \par She has been following with ENT for h/o P16 positive SCC, s/p resection and b/l neck dissection with 1/40 LNs having ? mets. She opted not to get RT, given the minimal benefits and significant toxicity. She follows with ENT at [facility] and had recent scans and fiberoptic exam which, according to the patient, were negative. She has a f/u appointment in April 2020. Denies having any new oropharyngeal/tongue masses. Feels like having persistent right cervical lump. No recent weight loss. \par \par Pt also has remote h/o right breast Ca s/p lumpectomy + re-excision + RT and chemo in 2004. She saw Dr Jacobson in Dec 2019. Mammogram from Nov 2019 was negative.\par \par She is also having regular US for thyroid nodules.   \par \par  [de-identified] : 9/14/20\par Patient here for follow up visit for MGUS, on observation;\par SCC of the tongue, S/P surgery and LN dissection, patient refused RT, being followed at [facility].\par Breast Ca, S/P surgery, RT and chemo completed in 2004.\par She has been doing well, although she admits to feeling more tired and fatigued recently.  Her appetite is not what it used to be, although her weight remains stable. She is getting affected emotionally by the Covid pandemic making her house-confined. \par She is being followed with fiberoptic scope Q 4 months at [facility].  She is due for annal mammogram in November. \par Patient denies cough, shortness of breath, fever, chills, or night sweats.  She has chronic R hip pain s/p hip fracture and replacement.\par \par 2/8/21\par Patient here for follow up visit.\par She is being followed for MGUS, SCC of the base of the tongue, as well as breast cancer.\par She c/o tiredness but admits she does not sleep well.\par She also feels the sensation of a lump in the throat which is bothersome.\par She is scoped regularly every 4 months at [facility] and no new visible lesion has been noted in the throat\par Patient denies cough, shortness of breath, fever, chills, night sweats or bone pain.\par She is up to date with mammogram; she sees Dr. Jacobson, her surgeon.\par \par

## 2021-02-09 NOTE — ASSESSMENT
[FreeTextEntry1] : #IgG Kappa MGUS: \par -- Will repeat CBC, CMP and myeloma panel.\par -- No evidence of end organ damage or progression on previous blood work.\par \par #SCC of tongue: P16 positive, S/P surgery with B/L LN dissection. ?1/40 LN positive for malignancy?\par -- No RT was given as per the pt's preference\par -- F/U with ENT at [facility]  every 4 months. As per the pt, recent scans and fiberoptic exam did not show any evidence of recurrence.\par \par #H/O Breast Ca: s/p lumpectomy + re-excision + RT and chemo in 2004. \par -- She saw Dr Jacobson in Dec 2020. Mammogram from Dec 2020 was negative. \par -- Next B/L mammogram in Nov 2021.\par \par #Thyroid nodules: S/P biopsy which was negative for malignancy. Being followed by Dr. Martell with sonogram.\par \par RTO in 6 months if the blood work is stable.\par \par Case discussed and patient seen with Dr. Lao who agreed with the above.\par

## 2021-02-10 LAB
ALBUMIN MFR SERPL ELPH: 55.3 %
ALBUMIN SERPL-MCNC: 4.5 G/DL
ALBUMIN/GLOB SERPL: 1.2 RATIO
ALPHA1 GLOB MFR SERPL ELPH: 3.3 %
ALPHA1 GLOB SERPL ELPH-MCNC: 0.3 G/DL
ALPHA2 GLOB MFR SERPL ELPH: 7.4 %
ALPHA2 GLOB SERPL ELPH-MCNC: 0.6 G/DL
B-GLOBULIN MFR SERPL ELPH: 8.2 %
B-GLOBULIN SERPL ELPH-MCNC: 0.7 G/DL
GAMMA GLOB FLD ELPH-MCNC: 2.1 G/DL
GAMMA GLOB MFR SERPL ELPH: 25.8 %
INTERPRETATION SERPL IEP-IMP: NORMAL
M PROTEIN MFR SERPL ELPH: NORMAL
M PROTEIN SPEC IFE-MCNC: NORMAL
MONOCLON BAND OBS SERPL: NORMAL
PROT SERPL-MCNC: 8.1 G/DL

## 2021-02-11 LAB
DEPRECATED KAPPA LC FREE/LAMBDA SER: 2.12 RATIO
IGG SER QL IEP: 2645 MG/DL
KAPPA LC CSF-MCNC: 2.32 MG/DL
KAPPA LC SERPL-MCNC: 4.92 MG/DL

## 2021-03-04 ENCOUNTER — APPOINTMENT (OUTPATIENT)
Dept: SURGERY | Facility: CLINIC | Age: 86
End: 2021-03-04

## 2021-06-28 ENCOUNTER — LABORATORY RESULT (OUTPATIENT)
Age: 86
End: 2021-06-28

## 2021-06-28 ENCOUNTER — APPOINTMENT (OUTPATIENT)
Dept: HEMATOLOGY ONCOLOGY | Facility: CLINIC | Age: 86
End: 2021-06-28
Payer: MEDICARE

## 2021-06-28 ENCOUNTER — OUTPATIENT (OUTPATIENT)
Dept: OUTPATIENT SERVICES | Facility: HOSPITAL | Age: 86
LOS: 1 days | Discharge: HOME | End: 2021-06-28

## 2021-06-28 VITALS
HEART RATE: 95 BPM | HEIGHT: 65 IN | DIASTOLIC BLOOD PRESSURE: 71 MMHG | TEMPERATURE: 97.5 F | BODY MASS INDEX: 20.49 KG/M2 | SYSTOLIC BLOOD PRESSURE: 163 MMHG | RESPIRATION RATE: 14 BRPM | WEIGHT: 123 LBS

## 2021-06-28 DIAGNOSIS — Z98.890 OTHER SPECIFIED POSTPROCEDURAL STATES: Chronic | ICD-10-CM

## 2021-06-28 DIAGNOSIS — H26.9 UNSPECIFIED CATARACT: Chronic | ICD-10-CM

## 2021-06-28 DIAGNOSIS — C02.9 MALIGNANT NEOPLASM OF TONGUE, UNSPECIFIED: ICD-10-CM

## 2021-06-28 DIAGNOSIS — D47.2 MONOCLONAL GAMMOPATHY: ICD-10-CM

## 2021-06-28 DIAGNOSIS — Z90.710 ACQUIRED ABSENCE OF BOTH CERVIX AND UTERUS: Chronic | ICD-10-CM

## 2021-06-28 DIAGNOSIS — Z85.3 PERSONAL HISTORY OF MALIGNANT NEOPLASM OF BREAST: ICD-10-CM

## 2021-06-28 DIAGNOSIS — Z90.49 ACQUIRED ABSENCE OF OTHER SPECIFIED PARTS OF DIGESTIVE TRACT: Chronic | ICD-10-CM

## 2021-06-28 PROCEDURE — 99214 OFFICE O/P EST MOD 30 MIN: CPT

## 2021-06-28 NOTE — REVIEW OF SYSTEMS
[Fatigue] : fatigue [Vision Problems] : vision problems [Constipation] : constipation [Joint Pain] : joint pain [Joint Stiffness] : joint stiffness [Negative] : Heme/Lymph

## 2021-06-28 NOTE — REASON FOR VISIT
[Follow-Up Visit] : a follow-up visit for [Monoclonal Gammopathy] : monoclonal gammopathy [Family Member] : family member

## 2021-06-29 LAB
ALBUMIN SERPL ELPH-MCNC: 4.6 G/DL
ALP BLD-CCNC: 106 U/L
ALT SERPL-CCNC: 11 U/L
ANION GAP SERPL CALC-SCNC: 12 MMOL/L
AST SERPL-CCNC: 18 U/L
BILIRUB SERPL-MCNC: 0.3 MG/DL
BUN SERPL-MCNC: 21 MG/DL
CALCIUM SERPL-MCNC: 9.7 MG/DL
CHLORIDE SERPL-SCNC: 101 MMOL/L
CO2 SERPL-SCNC: 28 MMOL/L
CREAT SERPL-MCNC: 1 MG/DL
GLUCOSE SERPL-MCNC: 117 MG/DL
HCT VFR BLD CALC: 40.5 %
HGB BLD-MCNC: 13.6 G/DL
LDH SERPL-CCNC: 162
MCHC RBC-ENTMCNC: 28.9 PG
MCHC RBC-ENTMCNC: 33.6 G/DL
MCV RBC AUTO: 86.2 FL
PLATELET # BLD AUTO: 246 K/UL
PMV BLD: 9.3 FL
POTASSIUM SERPL-SCNC: 4.5 MMOL/L
PROT SERPL-MCNC: 8.2 G/DL
RBC # BLD: 4.7 M/UL
RBC # FLD: 13.1 %
SODIUM SERPL-SCNC: 141 MMOL/L
WBC # FLD AUTO: 13.08 K/UL

## 2021-06-29 NOTE — ASSESSMENT
[FreeTextEntry1] : 1. IgG monoclonal gammopathy, clinically stable but IgG rising slowly, still below 3 g as of her last visit a few months ago.\par 2. History of H & N squamous cell carcinoma, S/P surgery, followed by her ENT specialist, examined thoroughly recently with no evidence of recurrence.\par 3. History of breast carcinoma, S/P surgery, followed by her surgeon.\par \par The situation was discussed with the patient and the daughter.\par At this time, we will repeat the blood work including CBC, CMP, LDH, IgG, SPEP with IFES, free light chains.\par \par Further recommendations after those results are available.\par \par All questions answered.\par \par If all stable, she will be seen again in 4-5 months for follow up\par

## 2021-06-29 NOTE — HISTORY OF PRESENT ILLNESS
[Disease:__________________________] : Disease: [unfilled] [de-identified] : The patient is coming for her regularly scheduled follow up for her monoclonal gammopathy with IgG which was slowly increasing but still below 3 g.\par She was seen by her ENT specialist  and no recurrence of the H & N cancer was found.\par She is no longer doing mammogram because of her age. She is not considered a candidate for colonoscopy or Pap smear.\par The patient is denying any fever or night sweats. No problems with urine but has an occasional constipation.\par She is on no new medications. [de-identified] : IgG

## 2021-06-29 NOTE — PHYSICAL EXAM
[Restricted in physically strenuous activity but ambulatory and able to carry out work of a light or sedentary nature] : Status 1- Restricted in physically strenuous activity but ambulatory and able to carry out work of a light or sedentary nature, e.g., light house work, office work [Normal] : affect appropriate [de-identified] : Mild chronic diplopia. Eyeglasses noted. [de-identified] : Post surgical changes with somewhat fibrotic sensation of the skin. No obvious masses. [de-identified] : Arthritic changes as before.

## 2021-06-30 LAB
DEPRECATED KAPPA LC FREE/LAMBDA SER: 2.03 RATIO
IGG SER QL IEP: 2068 MG/DL
KAPPA LC CSF-MCNC: 1.9 MG/DL
KAPPA LC SERPL-MCNC: 3.85 MG/DL

## 2021-07-07 LAB
ALBUMIN MFR SERPL ELPH: 54.5 %
ALBUMIN SERPL-MCNC: 4.6 G/DL
ALBUMIN/GLOB SERPL: 1.2 RATIO
ALPHA1 GLOB MFR SERPL ELPH: 3.5 %
ALPHA1 GLOB SERPL ELPH-MCNC: 0.3 G/DL
ALPHA2 GLOB MFR SERPL ELPH: 7.5 %
ALPHA2 GLOB SERPL ELPH-MCNC: 0.6 G/DL
B-GLOBULIN MFR SERPL ELPH: 8.6 %
B-GLOBULIN SERPL ELPH-MCNC: 0.7 G/DL
GAMMA GLOB FLD ELPH-MCNC: 2.2 G/DL
GAMMA GLOB MFR SERPL ELPH: 25.9 %
INTERPRETATION SERPL IEP-IMP: NORMAL
M PROTEIN MFR SERPL ELPH: NORMAL
M PROTEIN SPEC IFE-MCNC: NORMAL
MONOCLON BAND OBS SERPL: NORMAL
PROT SERPL-MCNC: 8.4 G/DL
PROT SERPL-MCNC: 8.4 G/DL

## 2021-10-25 ENCOUNTER — OUTPATIENT (OUTPATIENT)
Dept: OUTPATIENT SERVICES | Facility: HOSPITAL | Age: 86
LOS: 1 days | Discharge: HOME | End: 2021-10-25

## 2021-10-25 ENCOUNTER — APPOINTMENT (OUTPATIENT)
Dept: HEMATOLOGY ONCOLOGY | Facility: CLINIC | Age: 86
End: 2021-10-25
Payer: MEDICARE

## 2021-10-25 ENCOUNTER — LABORATORY RESULT (OUTPATIENT)
Age: 86
End: 2021-10-25

## 2021-10-25 VITALS
WEIGHT: 124 LBS | HEIGHT: 65 IN | DIASTOLIC BLOOD PRESSURE: 69 MMHG | BODY MASS INDEX: 20.66 KG/M2 | HEART RATE: 92 BPM | SYSTOLIC BLOOD PRESSURE: 160 MMHG | TEMPERATURE: 97.8 F | RESPIRATION RATE: 14 BRPM

## 2021-10-25 DIAGNOSIS — Z90.710 ACQUIRED ABSENCE OF BOTH CERVIX AND UTERUS: Chronic | ICD-10-CM

## 2021-10-25 DIAGNOSIS — Z98.890 OTHER SPECIFIED POSTPROCEDURAL STATES: Chronic | ICD-10-CM

## 2021-10-25 DIAGNOSIS — Z90.49 ACQUIRED ABSENCE OF OTHER SPECIFIED PARTS OF DIGESTIVE TRACT: Chronic | ICD-10-CM

## 2021-10-25 DIAGNOSIS — H26.9 UNSPECIFIED CATARACT: Chronic | ICD-10-CM

## 2021-10-25 LAB
HCT VFR BLD CALC: 39.9 %
HGB BLD-MCNC: 13.5 G/DL
MCHC RBC-ENTMCNC: 29.3 PG
MCHC RBC-ENTMCNC: 33.8 G/DL
MCV RBC AUTO: 86.6 FL
PLATELET # BLD AUTO: 245 K/UL
PMV BLD: 9.1 FL
RBC # BLD: 4.61 M/UL
RBC # FLD: 12.9 %
WBC # FLD AUTO: 10.08 K/UL

## 2021-10-25 PROCEDURE — 99214 OFFICE O/P EST MOD 30 MIN: CPT

## 2021-10-26 DIAGNOSIS — D47.2 MONOCLONAL GAMMOPATHY: ICD-10-CM

## 2021-10-26 LAB
ALBUMIN SERPL ELPH-MCNC: 4.5 G/DL
ALP BLD-CCNC: 109 U/L
ALT SERPL-CCNC: 12 U/L
ANION GAP SERPL CALC-SCNC: 14 MMOL/L
AST SERPL-CCNC: 15 U/L
BILIRUB SERPL-MCNC: 0.3 MG/DL
BUN SERPL-MCNC: 24 MG/DL
CALCIUM SERPL-MCNC: 9.3 MG/DL
CHLORIDE SERPL-SCNC: 101 MMOL/L
CO2 SERPL-SCNC: 26 MMOL/L
CREAT SERPL-MCNC: 1 MG/DL
DEPRECATED KAPPA LC FREE/LAMBDA SER: 1.82 RATIO
GLUCOSE SERPL-MCNC: 82 MG/DL
IGG SER QL IEP: 2224 MG/DL
KAPPA LC CSF-MCNC: 1.97 MG/DL
KAPPA LC SERPL-MCNC: 3.59 MG/DL
LDH SERPL-CCNC: 143
POTASSIUM SERPL-SCNC: 4.2 MMOL/L
PROT SERPL-MCNC: 8.2 G/DL
SODIUM SERPL-SCNC: 141 MMOL/L

## 2021-10-26 NOTE — ASSESSMENT
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

## 2021-10-26 NOTE — HISTORY OF PRESENT ILLNESS
[Disease:__________________________] : Disease: [unfilled] [de-identified] : The patient is coming for her regularly scheduled follow up for her monoclonal gammopathy with IgG kappa and lambda.\par At present, her complaints are that of tiredness and discomfort of the right hip.\par Otherwise, she still has some difficulty swallowing since she head the H & N surgery about 3 years ago.  She was last seen by her ENT specialist who didn't find any new lesions and she was recommended to follow up with her regular ENT.\par No fever or night sweats.

## 2021-10-26 NOTE — REVIEW OF SYSTEMS
[Fatigue] : fatigue [Vision Problems] : vision problems [Dysphagia] : dysphagia [Constipation] : constipation [Joint Pain] : joint pain [Joint Stiffness] : joint stiffness [Difficulty Walking] : difficulty walking [Negative] : Heme/Lymph [FreeTextEntry3] : The right eye was affected by shingles a few years ago and that decreased her vision. [FreeTextEntry7] : S/P colon surgery.

## 2021-10-26 NOTE — PHYSICAL EXAM
[Restricted in physically strenuous activity but ambulatory and able to carry out work of a light or sedentary nature] : Status 1- Restricted in physically strenuous activity but ambulatory and able to carry out work of a light or sedentary nature, e.g., light house work, office work [Thin] : thin [Normal] : affect appropriate [de-identified] : Eyeglasses noted. Mild diplopia [de-identified] : Significant arthritic changes [de-identified] : Although gait a little hesitant.

## 2021-10-29 LAB
ALBUMIN MFR SERPL ELPH: 54.6 %
ALBUMIN SERPL-MCNC: 4.4 G/DL
ALBUMIN/GLOB SERPL: 1.2 RATIO
ALPHA1 GLOB MFR SERPL ELPH: 3.2 %
ALPHA1 GLOB SERPL ELPH-MCNC: 0.3 G/DL
ALPHA2 GLOB MFR SERPL ELPH: 6.9 %
ALPHA2 GLOB SERPL ELPH-MCNC: 0.6 G/DL
B-GLOBULIN MFR SERPL ELPH: 8 %
B-GLOBULIN SERPL ELPH-MCNC: 0.6 G/DL
GAMMA GLOB FLD ELPH-MCNC: 2.2 G/DL
GAMMA GLOB MFR SERPL ELPH: 27.3 %
INTERPRETATION SERPL IEP-IMP: NORMAL
M PROTEIN MFR SERPL ELPH: NORMAL
M PROTEIN SPEC IFE-MCNC: NORMAL
MONOCLON BAND OBS SERPL: NORMAL
PROT SERPL-MCNC: 8.1 G/DL
PROT SERPL-MCNC: 8.1 G/DL

## 2022-05-16 ENCOUNTER — OUTPATIENT (OUTPATIENT)
Dept: OUTPATIENT SERVICES | Facility: HOSPITAL | Age: 87
LOS: 1 days | Discharge: HOME | End: 2022-05-16

## 2022-05-16 ENCOUNTER — APPOINTMENT (OUTPATIENT)
Dept: HEMATOLOGY ONCOLOGY | Facility: CLINIC | Age: 87
End: 2022-05-16
Payer: MEDICARE

## 2022-05-16 ENCOUNTER — LABORATORY RESULT (OUTPATIENT)
Age: 87
End: 2022-05-16

## 2022-05-16 VITALS
SYSTOLIC BLOOD PRESSURE: 172 MMHG | HEART RATE: 90 BPM | RESPIRATION RATE: 16 BRPM | BODY MASS INDEX: 19.83 KG/M2 | WEIGHT: 119 LBS | DIASTOLIC BLOOD PRESSURE: 71 MMHG | HEIGHT: 65 IN | TEMPERATURE: 98.6 F

## 2022-05-16 DIAGNOSIS — Z90.710 ACQUIRED ABSENCE OF BOTH CERVIX AND UTERUS: Chronic | ICD-10-CM

## 2022-05-16 DIAGNOSIS — Z98.890 OTHER SPECIFIED POSTPROCEDURAL STATES: Chronic | ICD-10-CM

## 2022-05-16 DIAGNOSIS — H26.9 UNSPECIFIED CATARACT: Chronic | ICD-10-CM

## 2022-05-16 DIAGNOSIS — Z90.49 ACQUIRED ABSENCE OF OTHER SPECIFIED PARTS OF DIGESTIVE TRACT: Chronic | ICD-10-CM

## 2022-05-16 PROCEDURE — 99214 OFFICE O/P EST MOD 30 MIN: CPT

## 2022-05-16 NOTE — REVIEW OF SYSTEMS
[Fatigue] : fatigue [Recent Change In Weight] : ~T recent weight change [Vision Problems] : vision problems [Joint Pain] : joint pain [Joint Stiffness] : joint stiffness [Negative] : Heme/Lymph

## 2022-05-17 DIAGNOSIS — Z85.810 PERSONAL HISTORY OF MALIGNANT NEOPLASM OF TONGUE: ICD-10-CM

## 2022-05-17 DIAGNOSIS — D47.2 MONOCLONAL GAMMOPATHY: ICD-10-CM

## 2022-05-17 LAB
ALBUMIN SERPL ELPH-MCNC: 4.3 G/DL
ALP BLD-CCNC: 96 U/L
ALT SERPL-CCNC: 8 U/L
ANION GAP SERPL CALC-SCNC: 12 MMOL/L
AST SERPL-CCNC: 13 U/L
BILIRUB SERPL-MCNC: 0.4 MG/DL
BUN SERPL-MCNC: 19 MG/DL
CALCIUM SERPL-MCNC: 9.1 MG/DL
CHLORIDE SERPL-SCNC: 101 MMOL/L
CO2 SERPL-SCNC: 27 MMOL/L
CREAT SERPL-MCNC: 1 MG/DL
DEPRECATED KAPPA LC FREE/LAMBDA SER: 2.43 RATIO
EGFR: 56 ML/MIN/1.73M2
GLUCOSE SERPL-MCNC: 99 MG/DL
HCT VFR BLD CALC: 38.1 %
HGB BLD-MCNC: 13.1 G/DL
IGG SER QL IEP: 2092 MG/DL
KAPPA LC CSF-MCNC: 2 MG/DL
KAPPA LC SERPL-MCNC: 4.86 MG/DL
LDH SERPL-CCNC: 160
MCHC RBC-ENTMCNC: 29.6 PG
MCHC RBC-ENTMCNC: 34.4 G/DL
MCV RBC AUTO: 86.2 FL
PLATELET # BLD AUTO: 248 K/UL
PMV BLD: 9.3 FL
POTASSIUM SERPL-SCNC: 4.1 MMOL/L
PROT SERPL-MCNC: 7.7 G/DL
RBC # BLD: 4.42 M/UL
RBC # FLD: 13.4 %
SODIUM SERPL-SCNC: 140 MMOL/L
WBC # FLD AUTO: 10.08 K/UL

## 2022-05-17 NOTE — ASSESSMENT
[FreeTextEntry1] : 1. Biclonal gammopathy with IgG kappa and lambda, clinically stable.\par We will draw CBC, CMP, LDH, SPEP with IFES, IgG, free light chains.\par 2. History of tongue carcinoma, S/P surgery (including neck dissection), being followed at [facility].\par \par Further recommendations, as needed, following the above test results. So far, she has been on observation since there has been no compelling reasons to proceed with further investigations, such as bone marrow studies, and treatment.\par \par All questions answered.\par \par If all the above test results are within acceptable limits, she will be seen again in 6 months (and as needed) for follow up.\par

## 2022-05-17 NOTE — HISTORY OF PRESENT ILLNESS
[Disease:__________________________] : Disease: [unfilled] [de-identified] : The patient is coming for her regularly scheduled follow up for her biclonal gammopathy with IgG kappa and lambda.\par A few weeks ago, she tripped and fell on her face. She developed a hematoma of the right face and damaged also the eyeglasses.\par Otherwise no fever or night sweats. Because of her previous H & N carcinoma treatments, she has difficulty eating solids but able to drink. She takes Ensure mostly.\par No new medications.\par No new pains or aches except some residual discomfort from the fall.\par No fever or night sweats.\par No new events in the family cancer or blood disease wise.\par She just took the shingles shot about a week ago.

## 2022-05-17 NOTE — CONSULT LETTER
[Dear  ___] : Dear  [unfilled], [Courtesy Letter:] : I had the pleasure of seeing your patient, [unfilled], in my office today. [Please see my note below.] : Please see my note below. [Consult Closing:] : Thank you very much for allowing me to participate in the care of this patient.  If you have any questions, please do not hesitate to contact me. [Sincerely,] : Sincerely, [FreeTextEntry2] : Dr. Caesar Fournier [FreeTextEntry3] : Dr. CHRISTY Lao

## 2022-05-17 NOTE — PHYSICAL EXAM
[Restricted in physically strenuous activity but ambulatory and able to carry out work of a light or sedentary nature] : Status 1- Restricted in physically strenuous activity but ambulatory and able to carry out work of a light or sedentary nature, e.g., light house work, office work [Normal] : affect appropriate [de-identified] : Eyeglasses noted [de-identified] : RR, grade I-II/ VI rumbling type of murmur best heard at the aortic area [de-identified] : Arthritic changes

## 2022-05-20 LAB
ALBUMIN MFR SERPL ELPH: 52.5 %
ALBUMIN SERPL-MCNC: 4.2 G/DL
ALBUMIN/GLOB SERPL: 1.1 RATIO
ALPHA1 GLOB MFR SERPL ELPH: 3.6 %
ALPHA1 GLOB SERPL ELPH-MCNC: 0.3 G/DL
ALPHA2 GLOB MFR SERPL ELPH: 7.9 %
ALPHA2 GLOB SERPL ELPH-MCNC: 0.6 G/DL
B-GLOBULIN MFR SERPL ELPH: 8.4 %
B-GLOBULIN SERPL ELPH-MCNC: 0.7 G/DL
GAMMA GLOB FLD ELPH-MCNC: 2.2 G/DL
GAMMA GLOB MFR SERPL ELPH: 27.6 %
INTERPRETATION SERPL IEP-IMP: NORMAL
M PROTEIN MFR SERPL ELPH: 7.5 %
M PROTEIN SPEC IFE-MCNC: NORMAL
MONOCLON BAND OBS SERPL: 0.6 G/DL
PROT SERPL-MCNC: 8 G/DL
PROT SERPL-MCNC: 8 G/DL

## 2022-11-14 ENCOUNTER — OUTPATIENT (OUTPATIENT)
Dept: OUTPATIENT SERVICES | Facility: HOSPITAL | Age: 87
LOS: 1 days | End: 2022-11-14

## 2022-11-14 ENCOUNTER — APPOINTMENT (OUTPATIENT)
Dept: HEMATOLOGY ONCOLOGY | Facility: CLINIC | Age: 87
End: 2022-11-14

## 2022-11-14 VITALS
TEMPERATURE: 98.4 F | HEART RATE: 88 BPM | SYSTOLIC BLOOD PRESSURE: 164 MMHG | RESPIRATION RATE: 16 BRPM | HEIGHT: 65 IN | DIASTOLIC BLOOD PRESSURE: 74 MMHG

## 2022-11-14 DIAGNOSIS — Z98.890 OTHER SPECIFIED POSTPROCEDURAL STATES: Chronic | ICD-10-CM

## 2022-11-14 DIAGNOSIS — Z90.710 ACQUIRED ABSENCE OF BOTH CERVIX AND UTERUS: Chronic | ICD-10-CM

## 2022-11-14 DIAGNOSIS — H26.9 UNSPECIFIED CATARACT: Chronic | ICD-10-CM

## 2022-11-14 DIAGNOSIS — Z90.49 ACQUIRED ABSENCE OF OTHER SPECIFIED PARTS OF DIGESTIVE TRACT: Chronic | ICD-10-CM

## 2022-11-14 PROCEDURE — 99214 OFFICE O/P EST MOD 30 MIN: CPT

## 2022-11-14 NOTE — REVIEW OF SYSTEMS
[Fatigue] : fatigue [Vision Problems] : vision problems [Joint Pain] : joint pain [Joint Stiffness] : joint stiffness [Insomnia] : insomnia [Negative] : Heme/Lymph

## 2022-11-15 DIAGNOSIS — D47.2 MONOCLONAL GAMMOPATHY: ICD-10-CM

## 2022-11-15 LAB
ALBUMIN SERPL ELPH-MCNC: 4.3 G/DL
ALP BLD-CCNC: 108 U/L
ALT SERPL-CCNC: 8 U/L
ANION GAP SERPL CALC-SCNC: 14 MMOL/L
AST SERPL-CCNC: 12 U/L
BASOPHILS # BLD AUTO: 0.04 K/UL
BASOPHILS NFR BLD AUTO: 0.3 %
BILIRUB SERPL-MCNC: 0.3 MG/DL
BUN SERPL-MCNC: 24 MG/DL
CALCIUM SERPL-MCNC: 9 MG/DL
CHLORIDE SERPL-SCNC: 104 MMOL/L
CO2 SERPL-SCNC: 25 MMOL/L
CREAT SERPL-MCNC: 0.9 MG/DL
DEPRECATED KAPPA LC FREE/LAMBDA SER: 2.74 RATIO
EGFR: 63 ML/MIN/1.73M2
EOSINOPHIL # BLD AUTO: 0.1 K/UL
EOSINOPHIL NFR BLD AUTO: 0.8 %
GLUCOSE SERPL-MCNC: 100 MG/DL
HCT VFR BLD CALC: 39.3 %
HGB BLD-MCNC: 13 G/DL
IGG SER QL IEP: 2376 MG/DL
IMM GRANULOCYTES NFR BLD AUTO: 0.4 %
KAPPA LC CSF-MCNC: 2.04 MG/DL
KAPPA LC SERPL-MCNC: 5.58 MG/DL
LDH SERPL-CCNC: 149
LYMPHOCYTES # BLD AUTO: 2.01 K/UL
LYMPHOCYTES NFR BLD AUTO: 16.3 %
MAN DIFF?: NORMAL
MCHC RBC-ENTMCNC: 28.8 PG
MCHC RBC-ENTMCNC: 33.1 G/DL
MCV RBC AUTO: 86.9 FL
MONOCYTES # BLD AUTO: 1.03 K/UL
MONOCYTES NFR BLD AUTO: 8.4 %
NEUTROPHILS # BLD AUTO: 9.1 K/UL
NEUTROPHILS NFR BLD AUTO: 73.8 %
PLATELET # BLD AUTO: 263 K/UL
POTASSIUM SERPL-SCNC: 4.7 MMOL/L
PROT SERPL-MCNC: 7.6 G/DL
RBC # BLD: 4.52 M/UL
RBC # FLD: 13.2 %
SODIUM SERPL-SCNC: 143 MMOL/L
WBC # FLD AUTO: 12.33 K/UL

## 2022-11-15 NOTE — HISTORY OF PRESENT ILLNESS
[Disease:__________________________] : Disease: [unfilled] [de-identified] : IgG kappa [de-identified] : The patient is coming for her regularly scheduled follow up for her monoclonal gammopathy with IgG kappa.\par At this time, she has no new complaints except that she feels very tired. Her sleep pattern has changed and she gets up at around 3 AM. \par Since her last fall several months ago, she thinks that she has eye problems from that fall but nothing has been diagnosed. Physically, she is not doing much. \par No new medications. \par Was seen by her ENT specialist in June and is followed regularly by him.

## 2022-11-15 NOTE — PHYSICAL EXAM
[Ambulatory and capable of all self care but unable to carry out any work activities] : Status 2- Ambulatory and capable of all self care but unable to carry out any work activities. Up and about more than 50% of waking hours [Normal] : affect appropriate [de-identified] : Slight diplopia (not new), eyeglasses noted [de-identified] : RR, grade I-II/VI systolic murmur best heard at the aortic area [de-identified] : Arthritic changes as before. [de-identified] : Slow ambulation.

## 2022-11-15 NOTE — ASSESSMENT
[FreeTextEntry1] : 1. Monoclonal gammopathy with IgG kappa, clinically stable.\par 2. History of tongue carcinoma, S/P surgery with no evidence of relapse so far 5 years since the initial diagnosis.\par \par There is slight general deterioration overall most likely age-related. She seems to be doing less physically according to the daughter.\par At this time, we will obtain CMP, CBC, LDH, SPEP with IFES, free light chains, quantitative IgG.\par Further recommendations after those results are available.\par \par If all within acceptable limits, she will be seen again in 6 months for follow up.\par \par All questions answered.

## 2022-11-21 LAB
ALBUMIN MFR SERPL ELPH: 53.1 %
ALBUMIN SERPL-MCNC: 4 G/DL
ALBUMIN/GLOB SERPL: 1.1 RATIO
ALPHA1 GLOB MFR SERPL ELPH: 3.4 %
ALPHA1 GLOB SERPL ELPH-MCNC: 0.3 G/DL
ALPHA2 GLOB MFR SERPL ELPH: 7.2 %
ALPHA2 GLOB SERPL ELPH-MCNC: 0.5 G/DL
B-GLOBULIN MFR SERPL ELPH: 8.3 %
B-GLOBULIN SERPL ELPH-MCNC: 0.6 G/DL
GAMMA GLOB FLD ELPH-MCNC: 2.1 G/DL
GAMMA GLOB MFR SERPL ELPH: 28 %
INTERPRETATION SERPL IEP-IMP: NORMAL
M PROTEIN MFR SERPL ELPH: 7.5 %
M PROTEIN SPEC IFE-MCNC: NORMAL
MONOCLON BAND OBS SERPL: 0.6 G/DL
PROT SERPL-MCNC: 7.6 G/DL

## 2023-04-06 ENCOUNTER — NON-APPOINTMENT (OUTPATIENT)
Age: 88
End: 2023-04-06

## 2023-04-06 ENCOUNTER — OUTPATIENT (OUTPATIENT)
Dept: OUTPATIENT SERVICES | Facility: HOSPITAL | Age: 88
LOS: 1 days | End: 2023-04-06
Payer: MEDICARE

## 2023-04-06 ENCOUNTER — APPOINTMENT (OUTPATIENT)
Dept: GERIATRICS | Facility: CLINIC | Age: 88
End: 2023-04-06
Payer: MEDICARE

## 2023-04-06 VITALS
SYSTOLIC BLOOD PRESSURE: 161 MMHG | BODY MASS INDEX: 19.99 KG/M2 | DIASTOLIC BLOOD PRESSURE: 74 MMHG | HEART RATE: 81 BPM | OXYGEN SATURATION: 99 % | TEMPERATURE: 98.1 F | WEIGHT: 120 LBS | HEIGHT: 65 IN

## 2023-04-06 DIAGNOSIS — I10 ESSENTIAL (PRIMARY) HYPERTENSION: ICD-10-CM

## 2023-04-06 DIAGNOSIS — Z90.49 ACQUIRED ABSENCE OF OTHER SPECIFIED PARTS OF DIGESTIVE TRACT: Chronic | ICD-10-CM

## 2023-04-06 DIAGNOSIS — G25.2 OTHER SPECIFIED FORMS OF TREMOR: ICD-10-CM

## 2023-04-06 DIAGNOSIS — Z98.890 OTHER SPECIFIED POSTPROCEDURAL STATES: Chronic | ICD-10-CM

## 2023-04-06 DIAGNOSIS — H26.9 UNSPECIFIED CATARACT: Chronic | ICD-10-CM

## 2023-04-06 DIAGNOSIS — C02.9 MALIGNANT NEOPLASM OF TONGUE, UNSPECIFIED: ICD-10-CM

## 2023-04-06 DIAGNOSIS — Z90.710 ACQUIRED ABSENCE OF BOTH CERVIX AND UTERUS: Chronic | ICD-10-CM

## 2023-04-06 PROCEDURE — 99204 OFFICE O/P NEW MOD 45 MIN: CPT

## 2023-04-06 NOTE — END OF VISIT
[] : Resident [FreeTextEntry3] : SEEN WITH LEELA TEAM\par Overt cognitive impairment - starting donepezil; cont with plans to f/u with neuro as above; pt and daughter advised re DXA but they are refusing for now (has fallen, is thin and at OP risk)\par Continue as above\par RTC 3-4 months

## 2023-04-06 NOTE — ASSESSMENT
[FreeTextEntry1] : 87 year old female with hx of HTN, hypothyroid, presenting for evaluation of memory loss. Patient started having memory loss 1 year ago after a fall on her head. she had MRIs in the past which showed no fracture or bleed per daughter. This past year she has been forgetting things on/off with associated resting tremor. no other complaints. she is compliant with her meds.\par \par # Memory loss - r/o alzh dementia vs pakrinson\par - MRI done outpatient --> significant atrophy\par - start donepezil 5 mg qd\par - Neuro referral\par - Lifestyle modifications encouraged and physical activity\par \par # HTN\par - continue arbesartan\par \par # Hypothyroid\par - continue synthroid\par - fu TSH\par \par # Health maint\par - DEXA ordered\par - blood work ordered\par - f/u in 3 months

## 2023-04-06 NOTE — HISTORY OF PRESENT ILLNESS
[FreeTextEntry1] : 87 year old female with hx of HTN, hypothyroid, presenting for evaluation of memory loss. Patient started having memory loss 1 year ago after a fall on her head. she had MRIs in the past which showed no fracture or bleed per daughter. This past year she has been forgetting things on/off with associated resting tremor. no other complaints. she is compliant with her meds.

## 2023-04-06 NOTE — PHYSICAL EXAM
[Alert] : alert [Normal Appearance] : the appearance of the neck was normal [No Respiratory Distress] : no respiratory distress [Normal S1, S2] : normal S1 and S2 [Abdomen Tenderness] : non-tender [Abdomen Soft] : soft [No CVA Tenderness] : no CVA  tenderness [] : no rash [No Focal Deficits] : no focal deficits

## 2023-04-10 DIAGNOSIS — E03.9 HYPOTHYROIDISM, UNSPECIFIED: ICD-10-CM

## 2023-04-10 DIAGNOSIS — G25.2 OTHER SPECIFIED FORMS OF TREMOR: ICD-10-CM

## 2023-04-10 DIAGNOSIS — F03.90 UNSPECIFIED DEMENTIA, UNSPECIFIED SEVERITY, WITHOUT BEHAVIORAL DISTURBANCE, PSYCHOTIC DISTURBANCE, MOOD DISTURBANCE, AND ANXIETY: ICD-10-CM

## 2023-04-10 DIAGNOSIS — I10 ESSENTIAL (PRIMARY) HYPERTENSION: ICD-10-CM

## 2023-04-10 DIAGNOSIS — C02.9 MALIGNANT NEOPLASM OF TONGUE, UNSPECIFIED: ICD-10-CM

## 2023-05-15 ENCOUNTER — APPOINTMENT (OUTPATIENT)
Dept: HEMATOLOGY ONCOLOGY | Facility: CLINIC | Age: 88
End: 2023-05-15

## 2023-06-12 ENCOUNTER — OUTPATIENT (OUTPATIENT)
Dept: OUTPATIENT SERVICES | Facility: HOSPITAL | Age: 88
LOS: 1 days | End: 2023-06-12
Payer: MEDICARE

## 2023-06-12 ENCOUNTER — APPOINTMENT (OUTPATIENT)
Dept: HEMATOLOGY ONCOLOGY | Facility: CLINIC | Age: 88
End: 2023-06-12
Payer: MEDICARE

## 2023-06-12 ENCOUNTER — LABORATORY RESULT (OUTPATIENT)
Age: 88
End: 2023-06-12

## 2023-06-12 VITALS
WEIGHT: 121 LBS | TEMPERATURE: 98.7 F | SYSTOLIC BLOOD PRESSURE: 170 MMHG | HEIGHT: 65 IN | RESPIRATION RATE: 16 BRPM | DIASTOLIC BLOOD PRESSURE: 73 MMHG | BODY MASS INDEX: 20.16 KG/M2 | HEART RATE: 79 BPM

## 2023-06-12 DIAGNOSIS — D47.2 MONOCLONAL GAMMOPATHY: ICD-10-CM

## 2023-06-12 DIAGNOSIS — Z90.49 ACQUIRED ABSENCE OF OTHER SPECIFIED PARTS OF DIGESTIVE TRACT: Chronic | ICD-10-CM

## 2023-06-12 DIAGNOSIS — H26.9 UNSPECIFIED CATARACT: Chronic | ICD-10-CM

## 2023-06-12 DIAGNOSIS — Z98.890 OTHER SPECIFIED POSTPROCEDURAL STATES: Chronic | ICD-10-CM

## 2023-06-12 DIAGNOSIS — Z90.710 ACQUIRED ABSENCE OF BOTH CERVIX AND UTERUS: Chronic | ICD-10-CM

## 2023-06-12 PROCEDURE — 85027 COMPLETE CBC AUTOMATED: CPT

## 2023-06-12 PROCEDURE — 83615 LACTATE (LD) (LDH) ENZYME: CPT

## 2023-06-12 PROCEDURE — 80053 COMPREHEN METABOLIC PANEL: CPT

## 2023-06-12 PROCEDURE — 99214 OFFICE O/P EST MOD 30 MIN: CPT

## 2023-06-12 PROCEDURE — 86334 IMMUNOFIX E-PHORESIS SERUM: CPT

## 2023-06-12 PROCEDURE — 82784 ASSAY IGA/IGD/IGG/IGM EACH: CPT

## 2023-06-12 PROCEDURE — 84165 PROTEIN E-PHORESIS SERUM: CPT

## 2023-06-12 PROCEDURE — 84155 ASSAY OF PROTEIN SERUM: CPT

## 2023-06-12 PROCEDURE — 83521 IG LIGHT CHAINS FREE EACH: CPT

## 2023-06-12 NOTE — REVIEW OF SYSTEMS
[Fatigue] : fatigue [Vision Problems] : vision problems [Joint Pain] : joint pain [Joint Stiffness] : joint stiffness [Insomnia] : insomnia [Negative] : Heme/Lymph [Dysphagia] : dysphagia

## 2023-06-13 DIAGNOSIS — D47.2 MONOCLONAL GAMMOPATHY: ICD-10-CM

## 2023-06-13 LAB
ALBUMIN SERPL ELPH-MCNC: 4.2 G/DL
ALP BLD-CCNC: 113 U/L
ALT SERPL-CCNC: 10 U/L
ANION GAP SERPL CALC-SCNC: 10 MMOL/L
AST SERPL-CCNC: 15 U/L
BILIRUB SERPL-MCNC: 0.4 MG/DL
BUN SERPL-MCNC: 24 MG/DL
CALCIUM SERPL-MCNC: 9.3 MG/DL
CHLORIDE SERPL-SCNC: 102 MMOL/L
CO2 SERPL-SCNC: 27 MMOL/L
CREAT SERPL-MCNC: 1 MG/DL
DEPRECATED KAPPA LC FREE/LAMBDA SER: 2.75 RATIO
EGFR: 55 ML/MIN/1.73M2
GLUCOSE SERPL-MCNC: 91 MG/DL
HCT VFR BLD CALC: 38.3 %
HGB BLD-MCNC: 13 G/DL
IGG SER QL IEP: 2010 MG/DL
KAPPA LC CSF-MCNC: 2.1 MG/DL
KAPPA LC SERPL-MCNC: 5.77 MG/DL
LDH SERPL-CCNC: 149
MCHC RBC-ENTMCNC: 28.7 PG
MCHC RBC-ENTMCNC: 33.9 G/DL
MCV RBC AUTO: 84.5 FL
PLATELET # BLD AUTO: 245 K/UL
PMV BLD: 9.6 FL
POTASSIUM SERPL-SCNC: 4.7 MMOL/L
PROT SERPL-MCNC: 7.9 G/DL
RBC # BLD: 4.53 M/UL
RBC # FLD: 13.9 %
SODIUM SERPL-SCNC: 139 MMOL/L
WBC # FLD AUTO: 12.05 K/UL

## 2023-06-13 NOTE — ASSESSMENT
[FreeTextEntry1] : 1. Monoclonal gammopathy with IgG kappa, clinically stable.\par 2. History of tongue carcinoma, S/P surgery with no evidence of relapse so far 5 years since the initial diagnosis.\par     She had ENT evaluation in December.\par 3. Borderline high WBC count of unclear etiology.\par \par There is slight general deterioration overall most likely age-related. She seems to be doing less physically according to the daughter.\par At this time, we will obtain CMP, CBC, LDH, SPEP with IFES, free light chains, quantitative IgG.\par Further recommendations after those results are available.\par \par If all within acceptable limits, she will be seen again in 6 months for follow up.\par \par All questions answered.\par \par Case discussed and patient seen with Dr. Lao who agreed with the above.\par

## 2023-06-13 NOTE — HISTORY OF PRESENT ILLNESS
[Disease:__________________________] : Disease: [unfilled] [de-identified] : The patient is coming for her regularly scheduled follow up for her monoclonal gammopathy with IgG kappa.\par At this time, she has no new complaints except that she feels very tired, does not sleep well.\par She denies any fevers, chills or night sweats.\par She has some difficulty swallowing since her surgery for tongue cancer, 5 years ago.\par Weight is essentially stable.\par She was put on donepezil for memory loss/dementia. [de-identified] : IgG kappa

## 2023-06-13 NOTE — PHYSICAL EXAM
[Ambulatory and capable of all self care but unable to carry out any work activities] : Status 2- Ambulatory and capable of all self care but unable to carry out any work activities. Up and about more than 50% of waking hours [Normal] : affect appropriate [de-identified] : Slight diplopia (not new), eyeglasses noted [de-identified] : RR, grade I-II/VI systolic murmur best heard at the aortic area [de-identified] : Arthritic changes as before. [de-identified] : Slow ambulation.

## 2023-06-19 LAB
ALBUMIN MFR SERPL ELPH: 53.4 %
ALBUMIN SERPL-MCNC: 4.2 G/DL
ALBUMIN/GLOB SERPL: 1.2 RATIO
ALPHA1 GLOB MFR SERPL ELPH: 3.6 %
ALPHA1 GLOB SERPL ELPH-MCNC: 0.3 G/DL
ALPHA2 GLOB MFR SERPL ELPH: 7.3 %
ALPHA2 GLOB SERPL ELPH-MCNC: 0.6 G/DL
B-GLOBULIN MFR SERPL ELPH: 8.1 %
B-GLOBULIN SERPL ELPH-MCNC: 0.6 G/DL
GAMMA GLOB FLD ELPH-MCNC: 2.2 G/DL
GAMMA GLOB MFR SERPL ELPH: 27.6 %
INTERPRETATION SERPL IEP-IMP: NORMAL
M PROTEIN MFR SERPL ELPH: 6.6 %
M PROTEIN SPEC IFE-MCNC: NORMAL
MONOCLON BAND OBS SERPL: 0.5 G/DL
PROT SERPL-MCNC: 7.8 G/DL
PROT SERPL-MCNC: 7.8 G/DL

## 2023-07-20 ENCOUNTER — APPOINTMENT (OUTPATIENT)
Dept: GERIATRICS | Facility: CLINIC | Age: 88
End: 2023-07-20
Payer: MEDICARE

## 2023-07-20 ENCOUNTER — OUTPATIENT (OUTPATIENT)
Dept: OUTPATIENT SERVICES | Facility: HOSPITAL | Age: 88
LOS: 1 days | End: 2023-07-20
Payer: MEDICARE

## 2023-07-20 VITALS
SYSTOLIC BLOOD PRESSURE: 129 MMHG | OXYGEN SATURATION: 97 % | TEMPERATURE: 97.5 F | HEART RATE: 84 BPM | BODY MASS INDEX: 19.83 KG/M2 | WEIGHT: 119 LBS | DIASTOLIC BLOOD PRESSURE: 67 MMHG | HEIGHT: 65 IN

## 2023-07-20 DIAGNOSIS — Z90.710 ACQUIRED ABSENCE OF BOTH CERVIX AND UTERUS: Chronic | ICD-10-CM

## 2023-07-20 DIAGNOSIS — Z90.49 ACQUIRED ABSENCE OF OTHER SPECIFIED PARTS OF DIGESTIVE TRACT: Chronic | ICD-10-CM

## 2023-07-20 DIAGNOSIS — H26.9 UNSPECIFIED CATARACT: Chronic | ICD-10-CM

## 2023-07-20 DIAGNOSIS — Z98.890 OTHER SPECIFIED POSTPROCEDURAL STATES: Chronic | ICD-10-CM

## 2023-07-20 DIAGNOSIS — Z00.00 ENCOUNTER FOR GENERAL ADULT MEDICAL EXAMINATION WITHOUT ABNORMAL FINDINGS: ICD-10-CM

## 2023-07-20 PROCEDURE — 99214 OFFICE O/P EST MOD 30 MIN: CPT

## 2023-07-20 RX ORDER — DONEPEZIL HYDROCHLORIDE 10 MG/1
10 TABLET ORAL DAILY
Qty: 90 | Refills: 0 | Status: ACTIVE | COMMUNITY
Start: 2023-07-20 | End: 1900-01-01

## 2023-07-20 RX ORDER — IRBESARTAN AND HYDROCHLOROTHIAZIDE 300; 12.5 MG/1; MG/1
300-12.5 TABLET ORAL
Refills: 0 | Status: DISCONTINUED | COMMUNITY
End: 2023-07-20

## 2023-07-20 RX ORDER — ASPIRIN 81 MG
81 TABLET, DELAYED RELEASE (ENTERIC COATED) ORAL
Refills: 0 | Status: DISCONTINUED | COMMUNITY
End: 2023-07-20

## 2023-07-20 RX ORDER — IRON,CARBONYL/CALCIUM 50-117MG
25 TABLET ORAL
Refills: 0 | Status: DISCONTINUED | COMMUNITY
End: 2023-07-20

## 2023-07-20 RX ORDER — LEVOTHYROXINE SODIUM 0.05 MG/1
50 TABLET ORAL DAILY
Qty: 30 | Refills: 5 | Status: DISCONTINUED | COMMUNITY
Start: 2023-04-06 | End: 2023-07-20

## 2023-07-20 RX ORDER — ESOMEPRAZOLE MAGNESIUM 40 MG/1
CAPSULE, DELAYED RELEASE ORAL
Refills: 0 | Status: DISCONTINUED | COMMUNITY
End: 2023-07-20

## 2023-07-20 RX ORDER — LEVOTHYROXINE SODIUM 137 UG/1
TABLET ORAL
Refills: 0 | Status: DISCONTINUED | COMMUNITY
End: 2023-07-20

## 2023-07-20 RX ORDER — IRBESARTAN 300 MG/1
300 TABLET ORAL
Qty: 60 | Refills: 5 | Status: ACTIVE | COMMUNITY
Start: 2023-04-06 | End: 1900-01-01

## 2023-07-20 RX ORDER — DONEPEZIL HYDROCHLORIDE 5 MG/1
5 TABLET ORAL
Qty: 30 | Refills: 5 | Status: DISCONTINUED | COMMUNITY
Start: 2023-04-06 | End: 2023-07-20

## 2023-07-20 NOTE — PHYSICAL EXAM
[No Acute Distress] : no acute distress [Normal Sclera/Conjunctiva] : normal sclera/conjunctiva [Normal Outer Ear/Nose] : the outer ears and nose were normal in appearance [No JVD] : no jugular venous distention [No Respiratory Distress] : no respiratory distress  [No Accessory Muscle Use] : no accessory muscle use [Clear to Auscultation] : lungs were clear to auscultation bilaterally [Normal Rate] : normal rate  [Regular Rhythm] : with a regular rhythm [Normal S1, S2] : normal S1 and S2 [No Murmur] : no murmur heard [No Carotid Bruits] : no carotid bruits [No Edema] : there was no peripheral edema [Soft] : abdomen soft [Non Tender] : non-tender [No HSM] : no HSM [Normal Bowel Sounds] : normal bowel sounds [No CVA Tenderness] : no CVA  tenderness [No Joint Swelling] : no joint swelling [No Rash] : no rash [Speech Grossly Normal] : speech grossly normal [Alert and Oriented x3] : oriented to person, place, and time [Normal Mood] : the mood was normal

## 2023-07-20 NOTE — HISTORY OF PRESENT ILLNESS
[FreeTextEntry1] : follow up [de-identified] : 87 year old female with hx of HTN, hypothyroid, MGUS, lingual cancer s/p resection, and memory loss. Patient started having more prominent memory loss 1 year ago after a fall on her head. she had MRIs in the past which showed no fracture or bleed per daughter. This past year she has been forgetting things on/off with associated resting tremor. She was started on donepzil last visit but says it did not help at all. She feels her mood is low due to her memory problems but still enjoys reading and spending time with her children, PHQ score 13, no suicide ideation. no other complaints. she is compliant with her meds. \par

## 2023-07-20 NOTE — END OF VISIT
[] : Resident [FreeTextEntry3] : SEEN WITH [facility] TEAM\par Here with daughter; feels that donepezil did nothing for her; she gets frustrated and depressed about her cognitive impairment - however she denies overt depression and is emphatic that she can experience arnulfo (IE: part for great-grandchildren)\par Plan to increase donepezil to 10mg daily (no adverse effects thus far)\par Also will decrease Synthroid for elevated TSH\par To see neuro in September; RTC 3 months.

## 2023-07-20 NOTE — REVIEW OF SYSTEMS
[Vision Problems] : vision problems [Memory Loss] : memory loss [Depression] : depression [Fever] : no fever [Night Sweats] : no night sweats [Discharge] : no discharge [Redness] : no redness [Itching] : no itching [Earache] : no earache [Nasal Discharge] : no nasal discharge [Chest Pain] : no chest pain [Palpitations] : no palpitations [Leg Claudication] : no leg claudication [Lower Ext Edema] : no lower extremity edema [Orthopnea] : no orthopnea [Shortness Of Breath] : no shortness of breath [Wheezing] : no wheezing [Cough] : no cough [Dyspnea on Exertion] : no dyspnea on exertion [Abdominal Pain] : no abdominal pain [Nausea] : no nausea [Constipation] : no constipation [Vomiting] : no vomiting [Dysuria] : no dysuria [Incontinence] : no incontinence [Hematuria] : no hematuria [Joint Pain] : no joint pain [Joint Stiffness] : no joint stiffness [Muscle Pain] : no muscle pain [Back Pain] : no back pain [Itching] : no itching [Mole Changes] : no mole changes [Skin Rash] : no skin rash [Headache] : no headache [Dizziness] : no dizziness [Fainting] : no fainting [Confusion] : no confusion [Unsteady Walking] : no ataxia [Suicidal] : not suicidal [Insomnia] : no insomnia [Anxiety] : no anxiety [FreeTextEntry3] : right eye cataract and starbimus

## 2023-07-20 NOTE — ASSESSMENT
[FreeTextEntry1] : 87 year old female with hx of HTN, hypothyroid, MGUS, lingual cancer s/p resection, and memory loss. Presented for follow up and says that her memory has not improved since starting donepezil last visit.\par \par # Memory loss - r/o alzh dementia vs Parkinson's\par - MRI done outpatient --> significant atrophy\par - increased donepezil 5mg to 10mg\par - Neuro referral in september\par - Lifestyle modifications encouraged and physical activity\par \par # HTN\par - /67\par - continue irbesartan\par \par # Hypothyroid\par -TSH is 0.27 \par - decrease synthroid from 50mcg to 25 mcg Qday\par - fu TSH\par \par # Health maintenance\par - DEXA scan refused after discussion on importance of bone density screening\par - blood work ordered\par - f/u in 3 months after neuro consult

## 2023-08-01 DIAGNOSIS — R53.83 OTHER FATIGUE: ICD-10-CM

## 2023-08-01 DIAGNOSIS — G25.2 OTHER SPECIFIED FORMS OF TREMOR: ICD-10-CM

## 2023-08-01 DIAGNOSIS — F03.90 UNSPECIFIED DEMENTIA WITHOUT BEHAVIORAL DISTURBANCE: ICD-10-CM

## 2023-08-01 DIAGNOSIS — E03.9 HYPOTHYROIDISM, UNSPECIFIED: ICD-10-CM

## 2023-08-01 DIAGNOSIS — I10 ESSENTIAL (PRIMARY) HYPERTENSION: ICD-10-CM

## 2023-09-19 ENCOUNTER — OUTPATIENT (OUTPATIENT)
Dept: OUTPATIENT SERVICES | Facility: HOSPITAL | Age: 88
LOS: 1 days | End: 2023-09-19
Payer: MEDICARE

## 2023-09-19 ENCOUNTER — APPOINTMENT (OUTPATIENT)
Dept: NEUROLOGY | Facility: CLINIC | Age: 88
End: 2023-09-19
Payer: MEDICARE

## 2023-09-19 DIAGNOSIS — Z90.49 ACQUIRED ABSENCE OF OTHER SPECIFIED PARTS OF DIGESTIVE TRACT: Chronic | ICD-10-CM

## 2023-09-19 DIAGNOSIS — Z00.00 ENCOUNTER FOR GENERAL ADULT MEDICAL EXAMINATION WITHOUT ABNORMAL FINDINGS: ICD-10-CM

## 2023-09-19 DIAGNOSIS — G31.83 DEMENTIA WITH LEWY BODIES: ICD-10-CM

## 2023-09-19 DIAGNOSIS — Z98.890 OTHER SPECIFIED POSTPROCEDURAL STATES: Chronic | ICD-10-CM

## 2023-09-19 DIAGNOSIS — F02.80 DEMENTIA WITH LEWY BODIES: ICD-10-CM

## 2023-09-19 PROCEDURE — 99203 OFFICE O/P NEW LOW 30 MIN: CPT

## 2023-09-20 DIAGNOSIS — G31.83 NEUROCOGNITIVE DISORDER WITH LEWY BODIES: ICD-10-CM

## 2023-10-05 ENCOUNTER — APPOINTMENT (OUTPATIENT)
Dept: GERIATRICS | Facility: CLINIC | Age: 88
End: 2023-10-05
Payer: MEDICARE

## 2023-10-05 ENCOUNTER — OUTPATIENT (OUTPATIENT)
Dept: OUTPATIENT SERVICES | Facility: HOSPITAL | Age: 88
LOS: 1 days | End: 2023-10-05
Payer: MEDICARE

## 2023-10-05 VITALS — SYSTOLIC BLOOD PRESSURE: 160 MMHG | DIASTOLIC BLOOD PRESSURE: 69 MMHG

## 2023-10-05 VITALS
HEART RATE: 72 BPM | BODY MASS INDEX: 19.83 KG/M2 | SYSTOLIC BLOOD PRESSURE: 162 MMHG | TEMPERATURE: 97.1 F | OXYGEN SATURATION: 100 % | DIASTOLIC BLOOD PRESSURE: 74 MMHG | WEIGHT: 119 LBS | HEIGHT: 65 IN

## 2023-10-05 DIAGNOSIS — Z23 ENCOUNTER FOR IMMUNIZATION: ICD-10-CM

## 2023-10-05 DIAGNOSIS — Z98.890 OTHER SPECIFIED POSTPROCEDURAL STATES: Chronic | ICD-10-CM

## 2023-10-05 DIAGNOSIS — Z00.00 ENCOUNTER FOR GENERAL ADULT MEDICAL EXAMINATION WITHOUT ABNORMAL FINDINGS: ICD-10-CM

## 2023-10-05 DIAGNOSIS — Z90.710 ACQUIRED ABSENCE OF BOTH CERVIX AND UTERUS: Chronic | ICD-10-CM

## 2023-10-05 DIAGNOSIS — E03.9 HYPOTHYROIDISM, UNSPECIFIED: ICD-10-CM

## 2023-10-05 DIAGNOSIS — Z90.49 ACQUIRED ABSENCE OF OTHER SPECIFIED PARTS OF DIGESTIVE TRACT: Chronic | ICD-10-CM

## 2023-10-05 DIAGNOSIS — F03.90 UNSPECIFIED DEMENTIA W/OUT BEHAVIORAL DISTURBANCE: ICD-10-CM

## 2023-10-05 DIAGNOSIS — H26.9 UNSPECIFIED CATARACT: Chronic | ICD-10-CM

## 2023-10-05 DIAGNOSIS — Z00.00 ENCOUNTER FOR GENERAL ADULT MEDICAL EXAMINATION W/OUT ABNORMAL FINDINGS: ICD-10-CM

## 2023-10-05 PROCEDURE — 90471 IMMUNIZATION ADMIN: CPT

## 2023-10-05 PROCEDURE — 99397 PER PM REEVAL EST PAT 65+ YR: CPT

## 2023-10-05 PROCEDURE — G0439: CPT

## 2023-10-05 PROCEDURE — 90662 IIV NO PRSV INCREASED AG IM: CPT

## 2023-10-05 RX ORDER — MEMANTINE HYDROCHLORIDE 5 MG/1
5 TABLET, FILM COATED ORAL
Qty: 30 | Refills: 6 | Status: ACTIVE | COMMUNITY
Start: 2023-10-05 | End: 1900-01-01

## 2023-10-05 RX ORDER — ESCITALOPRAM OXALATE 10 MG/1
10 TABLET ORAL DAILY
Qty: 30 | Refills: 3 | Status: ACTIVE | COMMUNITY
Start: 2023-10-05 | End: 1900-01-01

## 2023-10-19 DIAGNOSIS — E03.9 HYPOTHYROIDISM, UNSPECIFIED: ICD-10-CM

## 2023-10-19 DIAGNOSIS — Z00.00 ENCOUNTER FOR GENERAL ADULT MEDICAL EXAMINATION WITHOUT ABNORMAL FINDINGS: ICD-10-CM

## 2023-10-19 DIAGNOSIS — F03.90 UNSPECIFIED DEMENTIA, UNSPECIFIED SEVERITY, WITHOUT BEHAVIORAL DISTURBANCE, PSYCHOTIC DISTURBANCE, MOOD DISTURBANCE, AND ANXIETY: ICD-10-CM

## 2023-11-20 RX ORDER — LEVOTHYROXINE SODIUM 0.03 MG/1
25 TABLET ORAL
Qty: 90 | Refills: 3 | Status: ACTIVE | COMMUNITY
Start: 2023-07-20 | End: 1900-01-01

## 2024-01-02 ENCOUNTER — APPOINTMENT (OUTPATIENT)
Dept: NEUROLOGY | Facility: CLINIC | Age: 89
End: 2024-01-02

## 2024-01-18 ENCOUNTER — APPOINTMENT (OUTPATIENT)
Dept: GERIATRICS | Facility: CLINIC | Age: 89
End: 2024-01-18

## 2024-03-19 ENCOUNTER — APPOINTMENT (OUTPATIENT)
Dept: NEUROLOGY | Facility: CLINIC | Age: 89
End: 2024-03-19

## 2024-12-07 ENCOUNTER — EMERGENCY (EMERGENCY)
Facility: HOSPITAL | Age: 89
LOS: 0 days | Discharge: HOME | End: 2024-12-07
Attending: STUDENT IN AN ORGANIZED HEALTH CARE EDUCATION/TRAINING PROGRAM
Payer: MEDICARE

## 2024-12-07 VITALS
RESPIRATION RATE: 18 BRPM | DIASTOLIC BLOOD PRESSURE: 63 MMHG | OXYGEN SATURATION: 96 % | SYSTOLIC BLOOD PRESSURE: 151 MMHG | HEART RATE: 71 BPM | TEMPERATURE: 98 F

## 2024-12-07 VITALS
TEMPERATURE: 99 F | RESPIRATION RATE: 19 BRPM | DIASTOLIC BLOOD PRESSURE: 78 MMHG | HEIGHT: 67 IN | WEIGHT: 121.92 LBS | SYSTOLIC BLOOD PRESSURE: 143 MMHG | HEART RATE: 82 BPM | OXYGEN SATURATION: 95 %

## 2024-12-07 DIAGNOSIS — Z90.49 ACQUIRED ABSENCE OF OTHER SPECIFIED PARTS OF DIGESTIVE TRACT: Chronic | ICD-10-CM

## 2024-12-07 DIAGNOSIS — M25.551 PAIN IN RIGHT HIP: ICD-10-CM

## 2024-12-07 DIAGNOSIS — Z88.0 ALLERGY STATUS TO PENICILLIN: ICD-10-CM

## 2024-12-07 DIAGNOSIS — Z98.890 OTHER SPECIFIED POSTPROCEDURAL STATES: Chronic | ICD-10-CM

## 2024-12-07 DIAGNOSIS — Z90.710 ACQUIRED ABSENCE OF BOTH CERVIX AND UTERUS: Chronic | ICD-10-CM
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
## 2024-12-07 DIAGNOSIS — H26.9 UNSPECIFIED CATARACT: Chronic | ICD-10-CM

## 2024-12-07 DIAGNOSIS — Z88.1 ALLERGY STATUS TO OTHER ANTIBIOTIC AGENTS: ICD-10-CM
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
## 2024-12-07 DIAGNOSIS — S80.211A ABRASION, RIGHT KNEE, INITIAL ENCOUNTER: ICD-10-CM

## 2024-12-07 DIAGNOSIS — E03.9 HYPOTHYROIDISM, UNSPECIFIED: ICD-10-CM

## 2024-12-07 PROCEDURE — 70450 CT HEAD/BRAIN W/O DYE: CPT | Mod: MC

## 2024-12-07 PROCEDURE — 73562 X-RAY EXAM OF KNEE 3: CPT | Mod: 26,RT

## 2024-12-07 PROCEDURE — 74176 CT ABD & PELVIS W/O CONTRAST: CPT | Mod: MC

## 2024-12-07 PROCEDURE — 71250 CT THORAX DX C-: CPT | Mod: 26,MC

## 2024-12-07 PROCEDURE — 73502 X-RAY EXAM HIP UNI 2-3 VIEWS: CPT | Mod: 26,RT

## 2024-12-07 PROCEDURE — 70450 CT HEAD/BRAIN W/O DYE: CPT | Mod: 26,MC

## 2024-12-07 PROCEDURE — 99284 EMERGENCY DEPT VISIT MOD MDM: CPT | Mod: 25

## 2024-12-07 PROCEDURE — 99285 EMERGENCY DEPT VISIT HI MDM: CPT | Mod: GC

## 2024-12-07 PROCEDURE — 74176 CT ABD & PELVIS W/O CONTRAST: CPT | Mod: 26,MC

## 2024-12-07 PROCEDURE — 73502 X-RAY EXAM HIP UNI 2-3 VIEWS: CPT | Mod: RT

## 2024-12-07 PROCEDURE — 71045 X-RAY EXAM CHEST 1 VIEW: CPT | Mod: 26

## 2024-12-07 PROCEDURE — 71045 X-RAY EXAM CHEST 1 VIEW: CPT

## 2024-12-07 PROCEDURE — 71250 CT THORAX DX C-: CPT | Mod: MC

## 2024-12-07 PROCEDURE — 82962 GLUCOSE BLOOD TEST: CPT

## 2024-12-07 PROCEDURE — 73562 X-RAY EXAM OF KNEE 3: CPT | Mod: RT

## 2024-12-07 PROCEDURE — 72125 CT NECK SPINE W/O DYE: CPT | Mod: 26,MC

## 2024-12-07 PROCEDURE — 72125 CT NECK SPINE W/O DYE: CPT | Mod: MC

## 2024-12-07 RX ORDER — ACETAMINOPHEN 500 MG/5ML
975 LIQUID (ML) ORAL ONCE
Refills: 0 | Status: COMPLETED | OUTPATIENT
Start: 2024-12-07 | End: 2024-12-07

## 2024-12-07 RX ADMIN — Medication 975 MILLIGRAM(S): at 07:34

## 2024-12-20 NOTE — PROGRESS NOTE ADULT - SUBJECTIVE AND OBJECTIVE BOX
HPI  Patient is a 84y old Female who presents with a chief complaint of Fall (22 Oct 2019 20:33)    Currently admitted to medicine with the primary diagnosis of Closed fracture of right hip, initial encounter     Today is hospital day 3d.     INTERVAL HPI / OVERNIGHT EVENTS:  Patient was examined and seen at bedside. This morning she is resting comfortably in bed and reports no new issues or overnight events.     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 no greater than 0.2 mm and no more than 200 cells  Tonsil cancer  History of partial colectomy  Status post right breast lumpectomy  H/O abdominal hysterectomy  History of parathyroid surgery  Capsular cataract of both eyes    ALLERGIES  Biaxin (Unknown)  erythromycin (Unknown)  penicillin (Unknown)    MEDICATIONS  STANDING MEDICATIONS  calcium carbonate 1250 mG  + Vitamin D (OsCal 500 + D) 1 Tablet(s) Oral daily  chlorhexidine 2% Cloths 1 Application(s) Topical <User Schedule>  chlorhexidine 4% Liquid 1 Application(s) Topical <User Schedule>  ferrous    sulfate 325 milliGRAM(s) Oral daily  lactated ringers. 1000 milliLiter(s) IV Continuous <Continuous>  levothyroxine 50 MICROGram(s) Oral daily  losartan 100 milliGRAM(s) Oral daily  multivitamin 1 Tablet(s) Oral daily  polyethylene glycol 3350 17 Gram(s) Oral daily  vancomycin  IVPB 1000 milliGRAM(s) IV Intermittent once    PRN MEDICATIONS  acetaminophen   Tablet .. 650 milliGRAM(s) Oral every 4 hours PRN  aluminum hydroxide/magnesium hydroxide/simethicone Suspension 30 milliLiter(s) Oral four times a day PRN  magnesium hydroxide Suspension 30 milliLiter(s) Oral daily PRN  morphine  - Injectable 2 milliGRAM(s) IV Push every 6 hours PRN  ondansetron Injectable 4 milliGRAM(s) IV Push every 6 hours PRN  oxycodone    5 mG/acetaminophen 325 mG 1 Tablet(s) Oral every 4 hours PRN  senna 2 Tablet(s) Oral at bedtime PRN    VITALS:  T(F): 98.7  HR: 107  BP: 143/63  RR: 18  SpO2: 95%    PHYSICAL EXAM  GEN: NAD, Resting comfortably in bed  PULM: Clear to auscultation bilaterally, No wheezes  CVS: Regular rate and rhythm, S1-S2, no murmurs  ABD: Soft, non-tender, non-distended, no guarding  EXT: No edema  NEURO: A&Ox3, no focal deficits    LABS                        11.7   18.02 )-----------( 186      ( 23 Oct 2019 04:18 )             34.7     10-23    138  |  103  |  20  ----------------------------<  140<H>  4.4   |  22  |  1.1    Ca    7.6<L>      23 Oct 2019 04:18  Phos  4.4     10-23  Mg     1.9     10-23                    RADIOLOGY    123 Patient's belongings returned

## 2025-07-03 ENCOUNTER — APPOINTMENT (OUTPATIENT)
Dept: ORTHOPEDIC SURGERY | Facility: CLINIC | Age: 89
End: 2025-07-03
Payer: MEDICARE

## 2025-07-03 PROBLEM — S59.901A ELBOW INJURY, RIGHT, INITIAL ENCOUNTER: Status: ACTIVE | Noted: 2025-07-03

## 2025-07-03 PROBLEM — S69.91XA INJURY OF RIGHT WRIST, INITIAL ENCOUNTER: Status: ACTIVE | Noted: 2025-07-03

## 2025-07-03 PROCEDURE — 73130 X-RAY EXAM OF HAND: CPT | Mod: RT

## 2025-07-03 PROCEDURE — 73110 X-RAY EXAM OF WRIST: CPT | Mod: RT

## 2025-07-03 PROCEDURE — 73080 X-RAY EXAM OF ELBOW: CPT | Mod: RT

## 2025-07-03 PROCEDURE — 99203 OFFICE O/P NEW LOW 30 MIN: CPT | Mod: 25

## 2025-07-28 ENCOUNTER — APPOINTMENT (OUTPATIENT)
Dept: ORTHOPEDIC SURGERY | Facility: CLINIC | Age: 89
End: 2025-07-28

## 2025-09-09 ENCOUNTER — TRANSCRIPTION ENCOUNTER (OUTPATIENT)
Age: 89
End: 2025-09-09

## 2025-09-11 ENCOUNTER — TRANSCRIPTION ENCOUNTER (OUTPATIENT)
Age: 89
End: 2025-09-11